# Patient Record
Sex: MALE | Employment: FULL TIME | URBAN - METROPOLITAN AREA
[De-identification: names, ages, dates, MRNs, and addresses within clinical notes are randomized per-mention and may not be internally consistent; named-entity substitution may affect disease eponyms.]

---

## 2023-05-11 LAB
ABO GROUP (TYPE) IN BLOOD: NORMAL
ALANINE AMINOTRANSFERASE (SGPT) (U/L) IN SER/PLAS: 75 U/L (ref 10–52)
ALBUMIN (G/DL) IN SER/PLAS: 4.7 G/DL (ref 3.4–5)
ALKALINE PHOSPHATASE (U/L) IN SER/PLAS: 80 U/L (ref 33–120)
ANION GAP IN SER/PLAS: 13 MMOL/L (ref 10–20)
ASPARTATE AMINOTRANSFERASE (SGOT) (U/L) IN SER/PLAS: 38 U/L (ref 9–39)
BILIRUBIN TOTAL (MG/DL) IN SER/PLAS: 0.5 MG/DL (ref 0–1.2)
CALCIDIOL (25 OH VITAMIN D3) (NG/ML) IN SER/PLAS: 26 NG/ML
CALCIUM (MG/DL) IN SER/PLAS: 10.3 MG/DL (ref 8.6–10.6)
CARBON DIOXIDE, TOTAL (MMOL/L) IN SER/PLAS: 26 MMOL/L (ref 21–32)
CHLORIDE (MMOL/L) IN SER/PLAS: 107 MMOL/L (ref 98–107)
CHOLESTEROL (MG/DL) IN SER/PLAS: 167 MG/DL (ref 0–199)
CHOLESTEROL IN HDL (MG/DL) IN SER/PLAS: 36 MG/DL
CHOLESTEROL/HDL RATIO: 4.6
CREATININE (MG/DL) IN SER/PLAS: 1.34 MG/DL (ref 0.5–1.3)
ERYTHROCYTE DISTRIBUTION WIDTH (RATIO) BY AUTOMATED COUNT: 13.3 % (ref 11.5–14.5)
ERYTHROCYTE MEAN CORPUSCULAR HEMOGLOBIN CONCENTRATION (G/DL) BY AUTOMATED: 32.2 G/DL (ref 32–36)
ERYTHROCYTE MEAN CORPUSCULAR VOLUME (FL) BY AUTOMATED COUNT: 89 FL (ref 80–100)
ERYTHROCYTES (10*6/UL) IN BLOOD BY AUTOMATED COUNT: 5.28 X10E12/L (ref 4.5–5.9)
FERRITIN (UG/LL) IN SER/PLAS: 175 UG/L (ref 20–300)
GFR MALE: 77 ML/MIN/1.73M2
GLUCOSE (MG/DL) IN SER/PLAS: 98 MG/DL (ref 74–99)
HEMATOCRIT (%) IN BLOOD BY AUTOMATED COUNT: 47.2 % (ref 41–52)
HEMOGLOBIN (G/DL) IN BLOOD: 15.2 G/DL (ref 13.5–17.5)
HEPATITIS C VIRUS AB PRESENCE IN SERUM: NONREACTIVE
LDL: 113 MG/DL (ref 0–119)
LEUKOCYTES (10*3/UL) IN BLOOD BY AUTOMATED COUNT: 10.2 X10E9/L (ref 4.4–11.3)
NON HDL CHOLESTEROL: 131 MG/DL (ref 0–149)
NRBC (PER 100 WBCS) BY AUTOMATED COUNT: 0 /100 WBC (ref 0–0)
PLATELETS (10*3/UL) IN BLOOD AUTOMATED COUNT: 296 X10E9/L (ref 150–450)
POTASSIUM (MMOL/L) IN SER/PLAS: 4.1 MMOL/L (ref 3.5–5.3)
PROTEIN TOTAL: 7.5 G/DL (ref 6.4–8.2)
RH FACTOR: NORMAL
SODIUM (MMOL/L) IN SER/PLAS: 142 MMOL/L (ref 136–145)
THYROTROPIN (MIU/L) IN SER/PLAS BY DETECTION LIMIT <= 0.05 MIU/L: 1.49 MIU/L (ref 0.44–3.98)
TRIGLYCERIDE (MG/DL) IN SER/PLAS: 92 MG/DL (ref 0–149)
URATE (MG/DL) IN SER/PLAS: 8.4 MG/DL (ref 4–7.5)
UREA NITROGEN (MG/DL) IN SER/PLAS: 13 MG/DL (ref 6–23)
VLDL: 18 MG/DL (ref 0–40)

## 2023-07-20 LAB
APPEARANCE, URINE: CLEAR
BILIRUBIN, URINE: NEGATIVE
BLOOD, URINE: NEGATIVE
COLOR, URINE: YELLOW
GLUCOSE, URINE: NEGATIVE MG/DL
HYALINE CASTS, URINE: ABNORMAL /LPF
KETONES, URINE: NEGATIVE MG/DL
LEUKOCYTE ESTERASE, URINE: NEGATIVE
MUCUS, URINE: ABNORMAL /LPF
NITRITE, URINE: NEGATIVE
PH, URINE: 5 (ref 5–8)
PROTEIN, URINE: ABNORMAL MG/DL
RBC, URINE: 1 /HPF (ref 0–5)
SPECIFIC GRAVITY, URINE: 1.02 (ref 1–1.03)
SQUAMOUS EPITHELIAL CELLS, URINE: 1 /HPF
UROBILINOGEN, URINE: <2 MG/DL (ref 0–1.9)
WBC, URINE: 3 /HPF (ref 0–5)

## 2023-10-19 ENCOUNTER — ANCILLARY PROCEDURE (OUTPATIENT)
Dept: RADIOLOGY | Facility: CLINIC | Age: 23
End: 2023-10-19
Payer: COMMERCIAL

## 2023-10-19 DIAGNOSIS — S93.401A MODERATE RIGHT ANKLE SPRAIN, INITIAL ENCOUNTER: Primary | ICD-10-CM

## 2023-10-19 DIAGNOSIS — S93.401A MODERATE RIGHT ANKLE SPRAIN, INITIAL ENCOUNTER: ICD-10-CM

## 2023-10-19 PROCEDURE — 73610 X-RAY EXAM OF ANKLE: CPT | Mod: RIGHT SIDE | Performed by: INTERNAL MEDICINE

## 2023-10-19 PROCEDURE — 73610 X-RAY EXAM OF ANKLE: CPT | Mod: RT

## 2023-10-29 DIAGNOSIS — M25.511 ACUTE PAIN OF RIGHT SHOULDER: ICD-10-CM

## 2023-10-29 DIAGNOSIS — M25.561 ACUTE PAIN OF RIGHT KNEE: ICD-10-CM

## 2023-10-30 ENCOUNTER — ANCILLARY PROCEDURE (OUTPATIENT)
Dept: RADIOLOGY | Facility: CLINIC | Age: 23
End: 2023-10-30
Payer: COMMERCIAL

## 2023-10-30 DIAGNOSIS — M25.511 ACUTE PAIN OF RIGHT SHOULDER: ICD-10-CM

## 2023-10-30 DIAGNOSIS — M25.511 RIGHT SHOULDER PAIN, UNSPECIFIED CHRONICITY: Primary | ICD-10-CM

## 2023-10-30 PROCEDURE — 73030 X-RAY EXAM OF SHOULDER: CPT | Mod: RT,FY,398

## 2023-10-30 PROCEDURE — 73221 MRI JOINT UPR EXTREM W/O DYE: CPT | Mod: RIGHT SIDE | Performed by: STUDENT IN AN ORGANIZED HEALTH CARE EDUCATION/TRAINING PROGRAM

## 2023-10-30 PROCEDURE — 73221 MRI JOINT UPR EXTREM W/O DYE: CPT | Mod: RT,398

## 2023-10-30 PROCEDURE — 73030 X-RAY EXAM OF SHOULDER: CPT | Mod: RIGHT SIDE | Performed by: RADIOLOGY

## 2023-10-30 RX ORDER — CELECOXIB 200 MG/1
200 CAPSULE ORAL DAILY
Qty: 14 CAPSULE | Refills: 0 | Status: SHIPPED | OUTPATIENT
Start: 2023-10-30 | End: 2023-11-13 | Stop reason: SDUPTHER

## 2023-11-06 ENCOUNTER — ANCILLARY PROCEDURE (OUTPATIENT)
Dept: RADIOLOGY | Facility: CLINIC | Age: 23
End: 2023-11-06
Payer: COMMERCIAL

## 2023-11-06 DIAGNOSIS — M25.561 ACUTE PAIN OF RIGHT KNEE: ICD-10-CM

## 2023-11-06 PROCEDURE — 73721 MRI JNT OF LWR EXTRE W/O DYE: CPT | Mod: RT,398

## 2023-11-06 PROCEDURE — 73721 MRI JNT OF LWR EXTRE W/O DYE: CPT | Mod: RIGHT SIDE | Performed by: RADIOLOGY

## 2023-11-07 ENCOUNTER — OFFICE VISIT (OUTPATIENT)
Dept: ORTHOPEDIC SURGERY | Facility: HOSPITAL | Age: 23
End: 2023-11-07
Payer: COMMERCIAL

## 2023-11-07 DIAGNOSIS — S83.411A SPRAIN OF MEDIAL COLLATERAL LIGAMENT OF RIGHT KNEE, INITIAL ENCOUNTER: Primary | ICD-10-CM

## 2023-11-07 PROCEDURE — 1036F TOBACCO NON-USER: CPT | Performed by: ORTHOPAEDIC SURGERY

## 2023-11-09 PROBLEM — N39.44 PRIMARY NOCTURNAL ENURESIS: Status: ACTIVE | Noted: 2023-11-09

## 2023-11-13 DIAGNOSIS — M25.511 RIGHT SHOULDER PAIN, UNSPECIFIED CHRONICITY: ICD-10-CM

## 2023-11-13 RX ORDER — CELECOXIB 200 MG/1
200 CAPSULE ORAL DAILY
Qty: 14 CAPSULE | Refills: 0 | Status: SHIPPED | OUTPATIENT
Start: 2023-11-13 | End: 2023-11-27

## 2023-11-14 DIAGNOSIS — S83.411A GRADE 2 SPRAIN OF MEDIAL COLLATERAL LIGAMENT OF RIGHT KNEE: ICD-10-CM

## 2023-11-14 PROCEDURE — 20610 DRAIN/INJ JOINT/BURSA W/O US: CPT | Performed by: ORTHOPAEDIC SURGERY

## 2023-11-14 NOTE — PROGRESS NOTES
RIGHT KNEE PRP INJECTIONPatient ID: Horace Griffin is a 23 y.o. male.    Mr. Griffin presents for a planned right knee MCL PRP injection. He has been previously indicated and examined for the procedure.    Dx is right knee MCL sprain    L Inj/Asp: R knee on 11/14/2023 9:23 AM  Indications: pain  Details: 22 G needle, superolateral approach    PRP INJECTION  Procedure, treatment alternatives, risks and benefits explained, specific risks discussed. Consent was given by the patient. Immediately prior to procedure a time out was called to verify the correct patient, procedure, equipment, support staff and site/side marked as required. Patient was prepped and draped in the usual sterile fashion.       After verbal consent achieved 15 cc of autologous blood was withdrawn from the antecubital fossa.  This was spun down in the [employer] centrifuge and 5 cc of PRP was available for injection.  Under sterile conditions the superficial MCL was injected with a 22-gauge needle with 5 cc of PRP without incident.  There were no complications.  A dressing was applied.      He will continue with his planned rehabilitation program.

## 2023-12-08 ENCOUNTER — ANCILLARY PROCEDURE (OUTPATIENT)
Dept: RADIOLOGY | Facility: CLINIC | Age: 23
End: 2023-12-08
Payer: COMMERCIAL

## 2023-12-08 ENCOUNTER — OFFICE VISIT (OUTPATIENT)
Dept: ORTHOPEDIC SURGERY | Facility: HOSPITAL | Age: 23
End: 2023-12-08
Payer: COMMERCIAL

## 2023-12-08 DIAGNOSIS — S83.281A ACUTE LATERAL MENISCUS TEAR OF RIGHT KNEE, INITIAL ENCOUNTER: Primary | ICD-10-CM

## 2023-12-08 DIAGNOSIS — M25.561 ACUTE PAIN OF RIGHT KNEE: ICD-10-CM

## 2023-12-08 DIAGNOSIS — S83.411A SPRAIN OF MEDIAL COLLATERAL LIGAMENT OF RIGHT KNEE, INITIAL ENCOUNTER: ICD-10-CM

## 2023-12-08 PROCEDURE — 73721 MRI JNT OF LWR EXTRE W/O DYE: CPT | Mod: RIGHT SIDE | Performed by: STUDENT IN AN ORGANIZED HEALTH CARE EDUCATION/TRAINING PROGRAM

## 2023-12-08 PROCEDURE — 2500000005 HC RX 250 GENERAL PHARMACY W/O HCPCS: Performed by: ORTHOPAEDIC SURGERY

## 2023-12-08 PROCEDURE — 1036F TOBACCO NON-USER: CPT | Performed by: ORTHOPAEDIC SURGERY

## 2023-12-08 PROCEDURE — 73721 MRI JNT OF LWR EXTRE W/O DYE: CPT | Mod: RT,398

## 2023-12-08 PROCEDURE — 20610 DRAIN/INJ JOINT/BURSA W/O US: CPT | Performed by: ORTHOPAEDIC SURGERY

## 2023-12-08 RX ORDER — LIDOCAINE HYDROCHLORIDE 10 MG/ML
3 INJECTION INFILTRATION; PERINEURAL
Status: COMPLETED | OUTPATIENT
Start: 2023-12-08 | End: 2023-12-08

## 2023-12-08 RX ADMIN — LIDOCAINE HYDROCHLORIDE 3 ML: 10 INJECTION, SOLUTION INFILTRATION; PERINEURAL at 10:59

## 2023-12-08 NOTE — PROGRESS NOTES
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  He has previously suffered a right knee moderate MCL sprain he was able to recover from and return to football with conservative measures and use of a brace.  We previously performed a PRP in the MCL.  He has returned to play and suffered an acute right knee recurrent valgus load and twisting injury yesterday at practice.  This resulted in new symptoms laterally with some swelling in the knee.  Repeat MRI was performed revealing a radial tear of the lateral meniscus and some lateral compartment edema with acute recurrent injury to the distal MCL.  He has been removed from play at this point in time and conservative measures initiated.    Right knee with positive effusion and slightly decreased quadricep contraction.  Range of motion 0 to 110 degrees.  He can perform a straight leg raise.  Positive lateral joint line tenderness but negative Ricardo's.  Stable Lachman and posterior drawer.  MCL stable at 0 degrees but slight jog at 30 degrees.  Mild mid to distal MCL tenderness.  Intact neurovascular exam.    He presents for a planned right knee intra-articular aspiration and injection of PRP for symptomatic relief of an acute right knee injury.  Risks and benefits were discussed.  After questions were answered consent was obtained to proceed.  Under sterile conditions 15 cc of autologous blood was withdrawn from the left dorsal hand vein by our phlebotomist.  This was spun down in the [employer] centrifuge and 5 cc of PRP was available for injection.  The right knee was then aspirated from a superolateral patellar site under sterile conditions and 30 cc of slightly blood-tinged fluid was withdrawn.  The 5 cc of PRP was then injected without incident.  A dressing was applied.    L Inj/Asp: R knee on 12/8/2023 10:59 AM  Indications: pain and joint swelling  Details: 18 G needle, superolateral  approach  Medications: 3 mL lidocaine 10 mg/mL (1 %)  Aspirate: 33 mL blood-tinged  Procedure, treatment alternatives, risks and benefits explained, specific risks discussed. Immediately prior to procedure a time out was called to verify the correct patient, procedure, equipment, support staff and site/side marked as required. Patient was prepped and draped in the usual sterile fashion.       I reviewed the findings of a recurrent right knee MCL injury with radial tear of the lateral meniscus and new subchondral edema.  The aspiration and injection was performed today for therapeutic purposes.  I recommend he utilize his brace, modalities and supervised rehabilitation.  We did discuss this may require consideration for surgical repair to maximize his outcome.  We will obtain second opinion discussion with Dr. Gutierrez for further coordination of care.

## 2023-12-11 DIAGNOSIS — N39.44 PRIMARY NOCTURNAL ENURESIS: Primary | ICD-10-CM

## 2023-12-11 RX ORDER — DESMOPRESSIN ACETATE 0.2 MG/1
0.4 TABLET ORAL NIGHTLY
COMMUNITY
Start: 2023-08-12 | End: 2023-12-11 | Stop reason: SDUPTHER

## 2023-12-11 RX ORDER — DESMOPRESSIN ACETATE 0.2 MG/1
600 TABLET ORAL NIGHTLY
Qty: 90 TABLET | Refills: 2 | Status: SHIPPED | OUTPATIENT
Start: 2023-12-11 | End: 2024-01-03

## 2023-12-28 DIAGNOSIS — I10 ESSENTIAL HYPERTENSION: Primary | ICD-10-CM

## 2023-12-28 RX ORDER — LISINOPRIL 20 MG/1
20 TABLET ORAL DAILY
Qty: 30 TABLET | Refills: 11 | Status: SHIPPED | OUTPATIENT
Start: 2023-12-28 | End: 2024-06-10

## 2024-01-02 DIAGNOSIS — N39.44 PRIMARY NOCTURNAL ENURESIS: ICD-10-CM

## 2024-01-03 RX ORDER — DESMOPRESSIN ACETATE 0.2 MG/1
600 TABLET ORAL NIGHTLY
Qty: 270 TABLET | Refills: 1 | Status: SHIPPED | OUTPATIENT
Start: 2024-01-03 | End: 2024-04-02

## 2024-01-25 ENCOUNTER — OFFICE VISIT (OUTPATIENT)
Dept: ORTHOPEDIC SURGERY | Facility: HOSPITAL | Age: 24
End: 2024-01-25
Payer: COMMERCIAL

## 2024-01-25 DIAGNOSIS — S83.411A SPRAIN OF MEDIAL COLLATERAL LIGAMENT OF RIGHT KNEE, INITIAL ENCOUNTER: Primary | ICD-10-CM

## 2024-01-25 PROCEDURE — 99024 POSTOP FOLLOW-UP VISIT: CPT | Performed by: STUDENT IN AN ORGANIZED HEALTH CARE EDUCATION/TRAINING PROGRAM

## 2024-01-25 PROCEDURE — 1036F TOBACCO NON-USER: CPT | Performed by: STUDENT IN AN ORGANIZED HEALTH CARE EDUCATION/TRAINING PROGRAM

## 2024-01-25 RX ORDER — CEPHALEXIN 500 MG/1
500 CAPSULE ORAL EVERY 6 HOURS
Qty: 40 CAPSULE | Refills: 0 | Status: SHIPPED | OUTPATIENT
Start: 2024-01-25 | End: 2024-02-04

## 2024-01-30 NOTE — PROGRESS NOTES
Mr. Griffin presents noting a small suture tail coming out of the lateral portal site.  He is recovering well from his outside orthopedic surgery.  All of his sutures have been removed, however it does appear that he has a small Vicryl suture tail coming out of the lateral portal site.  No concern for infection.  He denies any fevers or chills.  No surrounding erythema or warmth.  No drainage.  The small suture tail was removed using only a pickup without complication.  This was dressed with Steri-Strips.  He was provided a prescription for Keflex to avoid any skin infection.  He will continue with his rehabilitation protocol per his primary surgeon.

## 2024-02-09 DIAGNOSIS — S83.411A SPRAIN OF MEDIAL COLLATERAL LIGAMENT OF RIGHT KNEE, INITIAL ENCOUNTER: ICD-10-CM

## 2024-02-09 DIAGNOSIS — S83.281S ACUTE LATERAL MENISCUS TEAR OF RIGHT KNEE, SEQUELA: ICD-10-CM

## 2024-03-13 DIAGNOSIS — Z20.2 POSSIBLE EXPOSURE TO STD: Primary | ICD-10-CM

## 2024-05-30 VITALS — BODY MASS INDEX: 36.45 KG/M2 | WEIGHT: 315 LBS | HEIGHT: 78 IN

## 2024-05-30 DIAGNOSIS — E66.01 CLASS 3 SEVERE OBESITY DUE TO EXCESS CALORIES WITHOUT SERIOUS COMORBIDITY WITH BODY MASS INDEX (BMI) OF 45.0 TO 49.9 IN ADULT (MULTI): Primary | ICD-10-CM

## 2024-05-30 DIAGNOSIS — E66.01 CLASS 3 SEVERE OBESITY DUE TO EXCESS CALORIES WITHOUT SERIOUS COMORBIDITY WITH BODY MASS INDEX (BMI) OF 45.0 TO 49.9 IN ADULT (MULTI): ICD-10-CM

## 2024-05-30 RX ORDER — TIRZEPATIDE 2.5 MG/.5ML
2.5 INJECTION, SOLUTION SUBCUTANEOUS
Qty: 2 ML | Refills: 2 | Status: SHIPPED | OUTPATIENT
Start: 2024-06-02

## 2024-05-30 NOTE — PROGRESS NOTES
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  He is now 425 pounds and would like to be below 375 pounds for his professional plain weight.  He wants to discuss the GLP-1 peptide agonist.  He denies any family history of cancer.    He has no other complaints.    Physical Examination:       5/11/2023     9:03 PM 6/7/2023     9:20 AM 6/7/2023     9:39 AM 5/30/2024     8:29 AM   Vitals   Systolic 155      Diastolic 95      Heart Rate 69      Temp  37.3 °C (99.1 °F) 37.3 °C (99.1 °F)    Height (in) 2.032 m (6' 8\")   2.032 m (6' 8\")   Weight (lb) 370   425   BMI 40.65 kg/m2   46.69 kg/m2   BSA (m2) 3.08 m2   3.3 m2     Problem List Items Addressed This Visit    None  Visit Diagnoses         Codes    Class 3 severe obesity due to excess calories without serious comorbidity with body mass index (BMI) of 45.0 to 49.9 in adult (Multi)    -  Primary E66.01, Z68.42    Relevant Medications    tirzepatide 2.5 mg/0.5 mL pen injector            Assessment and Plan:     We agreed to start a trial of Mounjaro weekly.  We will provide his first 2 doses in the training room over the next 2 weeks and teach him how to perform this himself.    **This note was dictated using Dragon speech recognition software and was not corrected for spelling or grammatical errors**.    Paul Boyd MD  Sports Medicine Specialist  [facility] [place]      "

## 2024-06-06 DIAGNOSIS — M25.561 CHRONIC PAIN OF RIGHT KNEE: ICD-10-CM

## 2024-06-06 DIAGNOSIS — G89.29 CHRONIC PAIN OF RIGHT KNEE: ICD-10-CM

## 2024-06-06 RX ORDER — CELECOXIB 200 MG/1
200 CAPSULE ORAL DAILY
Qty: 14 CAPSULE | Refills: 0 | Status: SHIPPED | OUTPATIENT
Start: 2024-06-06 | End: 2024-06-20

## 2024-06-10 ENCOUNTER — OFFICE VISIT (OUTPATIENT)
Dept: ORTHOPEDIC SURGERY | Facility: CLINIC | Age: 24
End: 2024-06-10
Payer: COMMERCIAL

## 2024-06-10 ENCOUNTER — LAB (OUTPATIENT)
Dept: LAB | Facility: LAB | Age: 24
End: 2024-06-10
Payer: COMMERCIAL

## 2024-06-10 VITALS
DIASTOLIC BLOOD PRESSURE: 110 MMHG | BODY MASS INDEX: 36.45 KG/M2 | SYSTOLIC BLOOD PRESSURE: 170 MMHG | HEART RATE: 60 BPM | HEIGHT: 78 IN | WEIGHT: 315 LBS

## 2024-06-10 DIAGNOSIS — L70.9 ACNE, UNSPECIFIED ACNE TYPE: ICD-10-CM

## 2024-06-10 DIAGNOSIS — J45.990 EXERCISE-INDUCED BRONCHOSPASM (HHS-HCC): ICD-10-CM

## 2024-06-10 DIAGNOSIS — I10 HTN (HYPERTENSION), BENIGN: ICD-10-CM

## 2024-06-10 DIAGNOSIS — E66.01 CLASS 3 SEVERE OBESITY WITHOUT SERIOUS COMORBIDITY WITH BODY MASS INDEX (BMI) OF 40.0 TO 44.9 IN ADULT, UNSPECIFIED OBESITY TYPE (MULTI): ICD-10-CM

## 2024-06-10 DIAGNOSIS — N39.44 PRIMARY NOCTURNAL ENURESIS: ICD-10-CM

## 2024-06-10 DIAGNOSIS — Z02.5 SPORTS PHYSICAL: Primary | ICD-10-CM

## 2024-06-10 DIAGNOSIS — G47.33 OSA (OBSTRUCTIVE SLEEP APNEA): ICD-10-CM

## 2024-06-10 DIAGNOSIS — Z02.5 SPORTS PHYSICAL: ICD-10-CM

## 2024-06-10 LAB
25(OH)D3 SERPL-MCNC: 19 NG/ML (ref 30–100)
ALBUMIN SERPL BCP-MCNC: 4.3 G/DL (ref 3.4–5)
ALP SERPL-CCNC: 90 U/L (ref 33–120)
ALT SERPL W P-5'-P-CCNC: 80 U/L (ref 10–52)
ANION GAP SERPL CALC-SCNC: 15 MMOL/L (ref 10–20)
APPEARANCE UR: CLEAR
AST SERPL W P-5'-P-CCNC: 33 U/L (ref 9–39)
BACTERIA #/AREA URNS AUTO: ABNORMAL /HPF
BASOPHILS # BLD AUTO: 0.04 X10*3/UL (ref 0–0.1)
BASOPHILS NFR BLD AUTO: 0.5 %
BILIRUB SERPL-MCNC: 0.6 MG/DL (ref 0–1.2)
BILIRUB UR STRIP.AUTO-MCNC: NEGATIVE MG/DL
BUN SERPL-MCNC: 12 MG/DL (ref 6–23)
CALCIUM SERPL-MCNC: 9.5 MG/DL (ref 8.6–10.6)
CHLORIDE SERPL-SCNC: 105 MMOL/L (ref 98–107)
CHOLEST SERPL-MCNC: 182 MG/DL (ref 0–199)
CHOLESTEROL/HDL RATIO: 5.2
CO2 SERPL-SCNC: 24 MMOL/L (ref 21–32)
COLOR UR: ABNORMAL
CREAT SERPL-MCNC: 1.24 MG/DL (ref 0.5–1.3)
EGFRCR SERPLBLD CKD-EPI 2021: 84 ML/MIN/1.73M*2
EOSINOPHIL # BLD AUTO: 0.09 X10*3/UL (ref 0–0.7)
EOSINOPHIL NFR BLD AUTO: 1.1 %
ERYTHROCYTE [DISTWIDTH] IN BLOOD BY AUTOMATED COUNT: 12.9 % (ref 11.5–14.5)
FERRITIN SERPL-MCNC: 167 NG/ML (ref 20–300)
GLUCOSE SERPL-MCNC: 109 MG/DL (ref 74–99)
GLUCOSE UR STRIP.AUTO-MCNC: NORMAL MG/DL
HCT VFR BLD AUTO: 48.7 % (ref 41–52)
HDLC SERPL-MCNC: 35.2 MG/DL
HGB BLD-MCNC: 16.5 G/DL (ref 13.5–17.5)
IMM GRANULOCYTES # BLD AUTO: 0.02 X10*3/UL (ref 0–0.7)
IMM GRANULOCYTES NFR BLD AUTO: 0.3 % (ref 0–0.9)
KETONES UR STRIP.AUTO-MCNC: NEGATIVE MG/DL
LDLC SERPL CALC-MCNC: 126 MG/DL
LEUKOCYTE ESTERASE UR QL STRIP.AUTO: ABNORMAL
LYMPHOCYTES # BLD AUTO: 2.29 X10*3/UL (ref 1.2–4.8)
LYMPHOCYTES NFR BLD AUTO: 28.7 %
MCH RBC QN AUTO: 29.2 PG (ref 26–34)
MCHC RBC AUTO-ENTMCNC: 33.9 G/DL (ref 32–36)
MCV RBC AUTO: 86 FL (ref 80–100)
MONOCYTES # BLD AUTO: 0.43 X10*3/UL (ref 0.1–1)
MONOCYTES NFR BLD AUTO: 5.4 %
MUCOUS THREADS #/AREA URNS AUTO: ABNORMAL /LPF
NEUTROPHILS # BLD AUTO: 5.12 X10*3/UL (ref 1.2–7.7)
NEUTROPHILS NFR BLD AUTO: 64 %
NITRITE UR QL STRIP.AUTO: NEGATIVE
NON HDL CHOLESTEROL: 147 MG/DL (ref 0–149)
NRBC BLD-RTO: 0 /100 WBCS (ref 0–0)
PH UR STRIP.AUTO: 6 [PH]
PLATELET # BLD AUTO: 256 X10*3/UL (ref 150–450)
POTASSIUM SERPL-SCNC: 3.8 MMOL/L (ref 3.5–5.3)
PROT SERPL-MCNC: 7.1 G/DL (ref 6.4–8.2)
PROT UR STRIP.AUTO-MCNC: ABNORMAL MG/DL
RBC # BLD AUTO: 5.66 X10*6/UL (ref 4.5–5.9)
RBC # UR STRIP.AUTO: NEGATIVE /UL
RBC #/AREA URNS AUTO: ABNORMAL /HPF
SODIUM SERPL-SCNC: 140 MMOL/L (ref 136–145)
SP GR UR STRIP.AUTO: 1.02
SQUAMOUS #/AREA URNS AUTO: ABNORMAL /HPF
TRIGL SERPL-MCNC: 105 MG/DL (ref 0–149)
TSH SERPL-ACNC: 1.19 MIU/L (ref 0.44–3.98)
URATE SERPL-MCNC: 9 MG/DL (ref 4–7.5)
UROBILINOGEN UR STRIP.AUTO-MCNC: NORMAL MG/DL
VLDL: 21 MG/DL (ref 0–40)
WBC # BLD AUTO: 8 X10*3/UL (ref 4.4–11.3)
WBC #/AREA URNS AUTO: ABNORMAL /HPF

## 2024-06-10 PROCEDURE — 80053 COMPREHEN METABOLIC PANEL: CPT | Mod: NFL

## 2024-06-10 PROCEDURE — 82728 ASSAY OF FERRITIN: CPT | Mod: NFL

## 2024-06-10 PROCEDURE — 84550 ASSAY OF BLOOD/URIC ACID: CPT | Mod: NFL

## 2024-06-10 PROCEDURE — 85025 COMPLETE CBC W/AUTO DIFF WBC: CPT | Mod: NFL

## 2024-06-10 PROCEDURE — 82306 VITAMIN D 25 HYDROXY: CPT | Mod: NFL

## 2024-06-10 PROCEDURE — 80061 LIPID PANEL: CPT | Mod: NFL

## 2024-06-10 PROCEDURE — 84443 ASSAY THYROID STIM HORMONE: CPT | Mod: NFL

## 2024-06-10 PROCEDURE — 81001 URINALYSIS AUTO W/SCOPE: CPT | Mod: NFL

## 2024-06-10 RX ORDER — LISINOPRIL 40 MG/1
40 TABLET ORAL DAILY
Qty: 30 TABLET | Refills: 11 | Status: SHIPPED | OUTPATIENT
Start: 2024-06-10 | End: 2025-06-10

## 2024-06-10 RX ORDER — BENZOYL PEROXIDE 100 MG/ML
LIQUID TOPICAL 2 TIMES DAILY
Qty: 227 G | Refills: 0 | Status: SHIPPED | OUTPATIENT
Start: 2024-06-10 | End: 2025-06-10

## 2024-06-10 NOTE — PROGRESS NOTES
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 He reports one previous concussion in April 2021 and was out for 2 weeks. He has had no residual issues with it. He denies any past history of heat illness, ADHD or migraine headache.   He has a positive history of childhood asthma and still uses an inhaler 2-3 times per year during cold weather activity.   He has a history of hypertension and had taken lisinopril 20 mg daily which we increased to 30 mg last year.  He admits that he does not take this every day and only when he feels like it. He has been on this for 6 or 7 years.   He reports sleep apnea since he was freshman in college.  He has CPAP machine but does not like the facemask and currently is not using it.   He was recently started on Mounjaro last week for weight loss and denies any side effects.  He is still having nocturnal enuresis and reports taking his desmopressin 0.60 mg dose every day.  He reports that this is doing well and he has no problems with that despite sharing no improvement multiple times throughout the football season.  He denies any drug allergies. He denies any supplementation use.    He has no other complaints today.    Physical Examination:       5/11/2023     9:03 PM 6/7/2023     9:20 AM 6/7/2023     9:39 AM 5/30/2024     8:29 AM 6/10/2024     5:39 PM   Vitals   Systolic 155    170   Diastolic 95    110   Heart Rate 69    60   Temp  37.3 °C (99.1 °F) 37.3 °C (99.1 °F)     Height (in) 2.032 m (6' 8\")   2.032 m (6' 8\") 2.032 m (6' 8\")   Weight (lb) 370   425 375   BMI 40.65 kg/m2   46.69 kg/m2 41.2 kg/m2   BSA (m2) 3.08 m2   3.3 m2 3.1 m2   Visit Report     Report       CONSTITUTIONAL  General appearance: Alert and in no acute distress. Well developed, well nourished.   HEAD AND FACE  Head and face: Normal.    EYES  External Eye, Conjunctiva and Lids: Normal external exam - pupils were equal in " size, round, reactive to light (PERRL) with normal accommodation and extraocular movements intact (EOMI).    Pupils and irises: Normal.    EARS, NOSE, MOUTH, AND THROAT  External inspection of ears and nose: Normal.     Hearing: Normal.     Nasal mucosa, septum, and turbinates: Normal.     Lips, teeth, and gums: Normal.     Oropharynx: Normal.    NECK  Neck: No neck mass was observed. Supple.    Thyroid: Not enlarged and there were no palpable thyroid nodules.   PULMONARY  Respiratory effort: No respiratory distress.    Auscultation of lungs: Clear bilateral breath sounds.   CARDIOVASCULAR  Auscultation of heart: Heart rate and rhythm were normal, normal S1 and S2, no gallops, no murmurs and no pericardial rub.    Femoral pulses: Normal.     Pedal pulses: Normal.    Peripheral vascular exam: Normal.     Examination of extremities: No peripheral edema.   ABDOMEN  Abdomen: Normal bowel sounds, soft nontender; no abdominal mass palpated. No rebound, rigidity or guarding.    Liver and spleen: No hepatosplenomegaly.    Examination for hernias: No hernias.   GENITOURINARY  Scrotal contents: Normal. The testicles were not swollen and there were no testicular masses.    Penis: No penile abnormalities.   LYMPHATIC  Palpation of lymph nodes in neck: No lymphadenopathy.   MUSCULOSKELETAL  Gait and station: Normal.    Digits and nails: No clubbing or cyanosis of the fingernails.    Inspection/palpation of joints, bones, and muscles: No joint swelling seen, normal movements of all extremities.    Range of motion: Normal.     Stability: Normal.     Muscle strength/tone: Normal.    SKIN  Skin and subcutaneous tissue: Normal skin color and pigmentation, normal skin turgor, and no rash.    Palpation of skin and subcutaneous tissue: Normal.    NEUROLOGIC  Cranial nerves: 2-12 grossly intact.    Cortical function: Normal.     Sensation: Normal.     Coordination: Normal.    PSYCHIATRIC  Judgment and insight: Intact.    Orientation to  person, place, and time: Alert and oriented x 3.    Recent and remote memory: Normal.     Mood and affect: Normal.    Imaging/studies:    Chest x-ray: two-view chest x-ray obtained today are without acute abnormality.    EKG: Normal sinus rhythm, no other abnormalities.    Labs: Pending     Problem List Items Addressed This Visit             ICD-10-CM    Primary nocturnal enuresis N39.44     Other Visit Diagnoses         Codes    Sports physical    -  Primary Z02.5    Relevant Orders    CBC and Auto Differential    Comprehensive Metabolic Panel    Ferritin    Lipid Panel    TSH with reflex to Free T4 if abnormal    Uric Acid    Urinalysis with Reflex Microscopic    Vitamin D 25-Hydroxy,Total (for eval of Vitamin D levels)    Acne, unspecified acne type     L70.9    Relevant Medications    benzoyl peroxide (Benzac AC) 10 % external wash    HTN (hypertension), benign     I10    CESAR (obstructive sleep apnea)     G47.33    Exercise-induced bronchospasm (HHS-HCC)     J45.990    Class 3 severe obesity without serious comorbidity with body mass index (BMI) of 40.0 to 44.9 in adult, unspecified obesity type (Multi)     E66.01, Z68.41              Discussion:     He was found to have a normal physical exam, EKG and chest x-ray today. He needs no further evaluation and is cleared for full participation.  HTN-we discussed that BP is too high and unacceptable to play football.  He admits to not taking medicine daily and I strongly encouraged him to start daily medicine.  We increase his dose also.  He will check his blood pressure 2-3 times per week in the training room with a large cuff and manual machine.  CESAR-we will try to get a better fitting mask for him and he was strongly encouraged to use to his CPAP nightly.  This should help his nocturnal enuresis and BP.  EIB-stable with current medicines  Obesity-continue current injectable medication, Mounjaro.  We will increase it every 4 weeks.  Nocturnal enuresis-strongly  encouraged to take medicine every night and continue current dose.    **This note was dictated using Dragon speech recognition software and was not corrected for spelling or grammatical errors**.    Paul Boyd MD  Sports Medicine Specialist  [facility] [place]

## 2024-06-12 DIAGNOSIS — E66.01 CLASS 3 SEVERE OBESITY WITHOUT SERIOUS COMORBIDITY WITH BODY MASS INDEX (BMI) OF 40.0 TO 44.9 IN ADULT, UNSPECIFIED OBESITY TYPE (MULTI): Primary | ICD-10-CM

## 2024-06-12 NOTE — PROGRESS NOTES
Patient presents for Mounjaro injection (Lot#[id number], exp 1/2/26).  Received dose last week in [facility]. No side effects reported.    Injection administered today in [facility]. No complications.     Return on 6/19 for next injection.

## 2024-06-19 DIAGNOSIS — E66.01 CLASS 3 SEVERE OBESITY DUE TO EXCESS CALORIES WITHOUT SERIOUS COMORBIDITY WITH BODY MASS INDEX (BMI) OF 45.0 TO 49.9 IN ADULT (MULTI): ICD-10-CM

## 2024-06-19 RX ORDER — TIRZEPATIDE 5 MG/.5ML
5 INJECTION, SOLUTION SUBCUTANEOUS
Qty: 2 ML | Refills: 0 | Status: SHIPPED | OUTPATIENT
Start: 2024-06-19

## 2024-07-07 DIAGNOSIS — L70.9 ACNE, UNSPECIFIED ACNE TYPE: ICD-10-CM

## 2024-07-08 RX ORDER — BENZOYL PEROXIDE 100 MG/ML
LIQUID TOPICAL 2 TIMES DAILY
Qty: 148 ML | Refills: 3 | Status: SHIPPED | OUTPATIENT
Start: 2024-07-08

## 2024-07-17 DIAGNOSIS — E66.01 CLASS 3 SEVERE OBESITY WITHOUT SERIOUS COMORBIDITY WITH BODY MASS INDEX (BMI) OF 40.0 TO 44.9 IN ADULT, UNSPECIFIED OBESITY TYPE (MULTI): Primary | ICD-10-CM

## 2024-07-17 RX ORDER — TIRZEPATIDE 7.5 MG/.5ML
7.5 INJECTION, SOLUTION SUBCUTANEOUS
Qty: 2 ML | Refills: 1 | Status: SHIPPED | OUTPATIENT
Start: 2024-07-17

## 2024-07-29 ENCOUNTER — DOCUMENTATION (OUTPATIENT)
Dept: ORTHOPEDIC SURGERY | Facility: CLINIC | Age: 24
End: 2024-07-29
Payer: COMMERCIAL

## 2024-07-29 NOTE — PROGRESS NOTES
Horace presented to the training room at the [facility] with recent STI exposure and he is complaining of some discharge.  He is going to go to the [facility] clinic today for testing and Rocephin.  He will follow up in training room.

## 2024-07-30 ENCOUNTER — DOCUMENTATION (OUTPATIENT)
Dept: ORTHOPEDIC SURGERY | Facility: CLINIC | Age: 24
End: 2024-07-30
Payer: COMMERCIAL

## 2024-07-30 NOTE — PROGRESS NOTES
His urine culture came back as ureaplasma that is susceptible to azithromycin and rocephin, both of which he got yesterday.  He states his symptoms have improved.

## 2024-08-14 DIAGNOSIS — E66.01 CLASS 3 SEVERE OBESITY WITHOUT SERIOUS COMORBIDITY WITH BODY MASS INDEX (BMI) OF 40.0 TO 44.9 IN ADULT, UNSPECIFIED OBESITY TYPE (MULTI): Primary | ICD-10-CM

## 2024-08-15 DIAGNOSIS — S83.281D ACUTE LATERAL MENISCUS TEAR OF RIGHT KNEE, SUBSEQUENT ENCOUNTER: Primary | ICD-10-CM

## 2024-08-15 RX ORDER — CELECOXIB 200 MG/1
200 CAPSULE ORAL DAILY
Qty: 14 CAPSULE | Refills: 0 | Status: SHIPPED | OUTPATIENT
Start: 2024-08-15 | End: 2024-08-29

## 2024-09-16 ENCOUNTER — HOSPITAL ENCOUNTER (OUTPATIENT)
Dept: RADIOLOGY | Facility: HOSPITAL | Age: 24
Discharge: HOME | End: 2024-09-16
Payer: COMMERCIAL

## 2024-09-16 DIAGNOSIS — M25.561 ACUTE PAIN OF RIGHT KNEE: Primary | ICD-10-CM

## 2024-09-16 DIAGNOSIS — M25.561 ACUTE PAIN OF RIGHT KNEE: ICD-10-CM

## 2024-09-16 PROCEDURE — 73721 MRI JNT OF LWR EXTRE W/O DYE: CPT | Mod: RT,398

## 2024-09-17 ENCOUNTER — OFFICE VISIT (OUTPATIENT)
Dept: ORTHOPEDIC SURGERY | Facility: HOSPITAL | Age: 24
End: 2024-09-17
Payer: COMMERCIAL

## 2024-09-17 DIAGNOSIS — M22.41 CHONDROMALACIA OF RIGHT PATELLOFEMORAL JOINT: ICD-10-CM

## 2024-09-17 DIAGNOSIS — M25.461 EFFUSION, RIGHT KNEE: Primary | ICD-10-CM

## 2024-09-17 PROCEDURE — 99213 OFFICE O/P EST LOW 20 MIN: CPT | Performed by: ORTHOPAEDIC SURGERY

## 2024-09-17 NOTE — PROGRESS NOTES
Mr. Griffin presented to the office for a planned right knee intra-articular aspiration and injection.  An MRI of the right knee was performed yesterday.  This was reviewed with Mr. Griffin as well as second opinion review by Todd Gutierrez in [place].  This reveals evidence of trochlear cartilage delamination with evidence of at least 1 small loose body.  There is a large effusion.  Prior lateral meniscus and MCL repair performed by Dr. Gutierrez appear grossly intact.  It was agreed upon by the patient, his agent and second opinion physician to perform an aspiration and injection of steroid medication as a therapeutic measure.  I discussed the risks and benefits with Mr. Griffin including infection, recurrent effusion, persistent pain, mechanical symptoms from the loose body, progressive degenerative changes.  He has no allergies to the planned injectable medications.  He provided verbal consent to proceed.  Right knee intra-articular arthrocentesis injection is offered for therapeutic purposes.  Indication is knee effusion and pain.  Risks and benefits of the injection were discussed.  After questions were answered, consent was provided to proceed.  Under sterile conditions, the knee was aspirated through a superolateral patellar site with an 18-gauge needle and 80 cc of straw-colored fluid was withdrawn.  The knee was then injected through a superolateral patellar site with 40 mg Kenalog and 4 cc lidocaine without complication.  The patient tolerated the injection well.  A dressing was applied.  Post injection instructions were given.  His compressive stocking was applied.  He will continue with his supervised rehabilitation and we will monitor this day to day.

## 2024-09-27 ENCOUNTER — DOCUMENTATION (OUTPATIENT)
Dept: ORTHOPEDIC SURGERY | Facility: CLINIC | Age: 24
End: 2024-09-27
Payer: COMMERCIAL

## 2024-09-27 VITALS — BODY MASS INDEX: 44.18 KG/M2 | WEIGHT: 315 LBS

## 2024-09-27 DIAGNOSIS — E66.01 CLASS 3 SEVERE OBESITY WITHOUT SERIOUS COMORBIDITY WITH BODY MASS INDEX (BMI) OF 40.0 TO 44.9 IN ADULT, UNSPECIFIED OBESITY TYPE: ICD-10-CM

## 2024-09-27 PROBLEM — Z78.9 ADVISED ABOUT MANAGEMENT OF WEIGHT: Status: ACTIVE | Noted: 2024-09-27

## 2024-09-27 NOTE — PROGRESS NOTES
I saw Horace today for his weight management.  He has been on Mounjaro since the start of the season.  We did a weigh in today for monitoring purposes.  He was weight in a pain of compression shorts and a left leg compression sleeve.  His weigh was 402.2 lbs.

## 2024-10-10 ENCOUNTER — TELEPHONE (OUTPATIENT)
Dept: ORTHOPEDIC SURGERY | Facility: HOSPITAL | Age: 24
End: 2024-10-10
Payer: COMMERCIAL

## 2024-10-10 DIAGNOSIS — R11.0 NAUSEA: Primary | ICD-10-CM

## 2024-10-10 RX ORDER — ONDANSETRON 8 MG/1
8 TABLET, ORALLY DISINTEGRATING ORAL EVERY 8 HOURS PRN
Qty: 20 TABLET | Refills: 3 | Status: SHIPPED | OUTPATIENT
Start: 2024-10-10 | End: 2025-01-08

## 2024-10-31 VITALS — BODY MASS INDEX: 32.86 KG/M2 | HEIGHT: 78 IN | WEIGHT: 284 LBS

## 2024-10-31 DIAGNOSIS — E66.813 CLASS 3 SEVERE OBESITY WITHOUT SERIOUS COMORBIDITY WITH BODY MASS INDEX (BMI) OF 40.0 TO 44.9 IN ADULT, UNSPECIFIED OBESITY TYPE: Primary | ICD-10-CM

## 2024-10-31 DIAGNOSIS — G47.33 OSA (OBSTRUCTIVE SLEEP APNEA): ICD-10-CM

## 2024-10-31 DIAGNOSIS — E66.01 CLASS 3 SEVERE OBESITY WITHOUT SERIOUS COMORBIDITY WITH BODY MASS INDEX (BMI) OF 40.0 TO 44.9 IN ADULT, UNSPECIFIED OBESITY TYPE: Primary | ICD-10-CM

## 2024-11-04 ENCOUNTER — HOSPITAL ENCOUNTER (OUTPATIENT)
Dept: RADIOLOGY | Facility: CLINIC | Age: 24
Discharge: HOME | End: 2024-11-04
Payer: COMMERCIAL

## 2024-11-04 DIAGNOSIS — M25.512 ACUTE PAIN OF LEFT SHOULDER: Primary | ICD-10-CM

## 2024-11-04 DIAGNOSIS — M25.512 ACUTE PAIN OF LEFT SHOULDER: ICD-10-CM

## 2024-11-04 PROCEDURE — 73221 MRI JOINT UPR EXTREM W/O DYE: CPT | Mod: LT,398

## 2024-11-04 RX ORDER — CELECOXIB 200 MG/1
200 CAPSULE ORAL DAILY
Qty: 14 CAPSULE | Refills: 0 | Status: SHIPPED | OUTPATIENT
Start: 2024-11-04 | End: 2024-11-18

## 2024-11-18 ENCOUNTER — PREP FOR PROCEDURE (OUTPATIENT)
Dept: ORTHOPEDIC SURGERY | Facility: CLINIC | Age: 24
End: 2024-11-18

## 2024-11-18 ENCOUNTER — ANESTHESIA EVENT (OUTPATIENT)
Dept: OPERATING ROOM | Facility: HOSPITAL | Age: 24
End: 2024-11-18
Payer: COMMERCIAL

## 2024-11-18 ENCOUNTER — HOSPITAL ENCOUNTER (OUTPATIENT)
Dept: RADIOLOGY | Facility: CLINIC | Age: 24
Discharge: HOME | End: 2024-11-18
Payer: COMMERCIAL

## 2024-11-18 DIAGNOSIS — S99.912A LEFT ANKLE INJURY, INITIAL ENCOUNTER: ICD-10-CM

## 2024-11-18 DIAGNOSIS — S82.839A TRAUMATIC CLOSED DISPLACED FRACTURE OF DISTAL FIBULA: ICD-10-CM

## 2024-11-18 PROBLEM — I10 HTN (HYPERTENSION): Status: ACTIVE | Noted: 2024-11-18

## 2024-11-18 PROBLEM — E66.9 OBESITY: Status: ACTIVE | Noted: 2024-11-18

## 2024-11-18 PROCEDURE — 73721 MRI JNT OF LWR EXTRE W/O DYE: CPT | Mod: LT,398

## 2024-11-18 SDOH — HEALTH STABILITY: MENTAL HEALTH: CURRENT SMOKER: 0

## 2024-11-18 NOTE — ANESTHESIA PREPROCEDURE EVALUATION
Patient: Horace Griffin    Procedure Information       Date/Time: 11/19/24 0710    Procedure: *FIRST CASE, 23 HOUR* LEFT Open Reduction Internal Fixation Distal Fibula, tight rope fixation, diagnostic arthroscopy (LMA/PNB, FLUORO) (Arthrex Fibula Fx set, tightropes, Athrex nanoscope, FLUORO, 5 inch plaster splint material) (Left: Ankle) - CONFIDENTIAL, LAST CASE, NEEDS FLUORO    Location: [facility] OR  / [facility]    Surgeons: Nickolas Bolanos MD          No past medical history on file.  No past surgical history on file.    Relevant Problems   Anesthesia (within normal limits)      Cardiac   (+) HTN (hypertension)       Clinical information reviewed:                   NPO Detail:  No data recorded     Physical Exam    Airway  Mallampati: II  TM distance: >3 FB  Neck ROM: full     Cardiovascular   Comments: deferred   Dental   Comments: No loose teeth   Pulmonary   Comments: deferred   Abdominal     Comments: deferred           Anesthesia Plan    History of general anesthesia?: yes  History of complications of general anesthesia?: no    ASA 2     general and regional   (On Tirzepatide, last dose 1 week ago)  The patient is not a current smoker.  Patient was not previously instructed to abstain from smoking on day of procedure.  Patient did not smoke on day of procedure.  Education provided regarding risk of obstructive sleep apnea.  intravenous induction   Postoperative administration of opioids is intended.  Anesthetic plan and risks discussed with patient.  Use of blood products discussed with patient who consented to blood products.    Plan discussed with CRNA and CAA.

## 2024-11-19 ENCOUNTER — APPOINTMENT (OUTPATIENT)
Dept: RADIOLOGY | Facility: HOSPITAL | Age: 24
End: 2024-11-19
Payer: COMMERCIAL

## 2024-11-19 ENCOUNTER — ANESTHESIA (OUTPATIENT)
Dept: OPERATING ROOM | Facility: HOSPITAL | Age: 24
End: 2024-11-19
Payer: COMMERCIAL

## 2024-11-19 ENCOUNTER — HOSPITAL ENCOUNTER (OUTPATIENT)
Facility: HOSPITAL | Age: 24
Discharge: HOME | End: 2024-11-20
Attending: ORTHOPAEDIC SURGERY | Admitting: ORTHOPAEDIC SURGERY
Payer: COMMERCIAL

## 2024-11-19 DIAGNOSIS — S82.62XA CLOSED DISPLACED FRACTURE OF LATERAL MALLEOLUS OF LEFT FIBULA, INITIAL ENCOUNTER: Primary | ICD-10-CM

## 2024-11-19 PROCEDURE — 2500000001 HC RX 250 WO HCPCS SELF ADMINISTERED DRUGS (ALT 637 FOR MEDICARE OP): Performed by: PHYSICIAN ASSISTANT

## 2024-11-19 PROCEDURE — 2500000004 HC RX 250 GENERAL PHARMACY W/ HCPCS (ALT 636 FOR OP/ED): Performed by: ORTHOPAEDIC SURGERY

## 2024-11-19 PROCEDURE — 2780000003 HC OR 278 NO HCPCS: Performed by: ORTHOPAEDIC SURGERY

## 2024-11-19 PROCEDURE — 7100000011 HC EXTENDED STAY RECOVERY HOURLY - NURSING UNIT

## 2024-11-19 PROCEDURE — 2500000004 HC RX 250 GENERAL PHARMACY W/ HCPCS (ALT 636 FOR OP/ED)

## 2024-11-19 PROCEDURE — 27792 TREATMENT OF ANKLE FRACTURE: CPT | Performed by: ORTHOPAEDIC SURGERY

## 2024-11-19 PROCEDURE — 2720000007 HC OR 272 NO HCPCS: Performed by: ORTHOPAEDIC SURGERY

## 2024-11-19 PROCEDURE — 7100000002 HC RECOVERY ROOM TIME - EACH INCREMENTAL 1 MINUTE: Performed by: ORTHOPAEDIC SURGERY

## 2024-11-19 PROCEDURE — 99242 OFF/OP CONSLTJ NEW/EST SF 20: CPT | Performed by: STUDENT IN AN ORGANIZED HEALTH CARE EDUCATION/TRAINING PROGRAM

## 2024-11-19 PROCEDURE — 7100000001 HC RECOVERY ROOM TIME - INITIAL BASE CHARGE: Performed by: ORTHOPAEDIC SURGERY

## 2024-11-19 PROCEDURE — 3600000009 HC OR TIME - EACH INCREMENTAL 1 MINUTE - PROCEDURE LEVEL FOUR: Performed by: ORTHOPAEDIC SURGERY

## 2024-11-19 PROCEDURE — 2500000005 HC RX 250 GENERAL PHARMACY W/O HCPCS

## 2024-11-19 PROCEDURE — 29898 ANKLE ARTHROSCOPY/SURGERY: CPT | Performed by: ORTHOPAEDIC SURGERY

## 2024-11-19 PROCEDURE — 2500000001 HC RX 250 WO HCPCS SELF ADMINISTERED DRUGS (ALT 637 FOR MEDICARE OP): Performed by: STUDENT IN AN ORGANIZED HEALTH CARE EDUCATION/TRAINING PROGRAM

## 2024-11-19 PROCEDURE — 2500000004 HC RX 250 GENERAL PHARMACY W/ HCPCS (ALT 636 FOR OP/ED): Performed by: PHYSICIAN ASSISTANT

## 2024-11-19 PROCEDURE — 2500000004 HC RX 250 GENERAL PHARMACY W/ HCPCS (ALT 636 FOR OP/ED): Performed by: STUDENT IN AN ORGANIZED HEALTH CARE EDUCATION/TRAINING PROGRAM

## 2024-11-19 PROCEDURE — 27829 TREAT LOWER LEG JOINT: CPT | Performed by: ORTHOPAEDIC SURGERY

## 2024-11-19 PROCEDURE — 3600000004 HC OR TIME - INITIAL BASE CHARGE - PROCEDURE LEVEL FOUR: Performed by: ORTHOPAEDIC SURGERY

## 2024-11-19 PROCEDURE — 2500000001 HC RX 250 WO HCPCS SELF ADMINISTERED DRUGS (ALT 637 FOR MEDICARE OP): Performed by: HOSPITALIST

## 2024-11-19 PROCEDURE — C1713 ANCHOR/SCREW BN/BN,TIS/BN: HCPCS | Performed by: ORTHOPAEDIC SURGERY

## 2024-11-19 PROCEDURE — 3700000002 HC GENERAL ANESTHESIA TIME - EACH INCREMENTAL 1 MINUTE: Performed by: ORTHOPAEDIC SURGERY

## 2024-11-19 PROCEDURE — 3700000001 HC GENERAL ANESTHESIA TIME - INITIAL BASE CHARGE: Performed by: ORTHOPAEDIC SURGERY

## 2024-11-19 DEVICE — IMPLANTABLE DEVICE: Type: IMPLANTABLE DEVICE | Site: ANKLE | Status: NON-FUNCTIONAL

## 2024-11-19 DEVICE — SCREW, LOW PROFILE, CORTICAL, 3.5 X 16M, SS: Type: IMPLANTABLE DEVICE | Site: ANKLE | Status: FUNCTIONAL

## 2024-11-19 DEVICE — IMPLANTABLE DEVICE: Type: IMPLANTABLE DEVICE | Site: ANKLE | Status: FUNCTIONAL

## 2024-11-19 DEVICE — SCREW, LOW PROFILE, LOCKING, 2.7MM X 14MM, SS: Type: IMPLANTABLE DEVICE | Site: ANKLE | Status: FUNCTIONAL

## 2024-11-19 DEVICE — SCREW, LOW PROFILE, CORTICAL, 3.5 X 14MM, SS: Type: IMPLANTABLE DEVICE | Site: ANKLE | Status: FUNCTIONAL

## 2024-11-19 DEVICE — K-LESS T-ROPE W/DRV, SYN REPR, SS
Type: IMPLANTABLE DEVICE | Site: ANKLE | Status: FUNCTIONAL
Brand: ARTHREX®

## 2024-11-19 RX ORDER — TALC
3 POWDER (GRAM) TOPICAL NIGHTLY PRN
Status: DISCONTINUED | OUTPATIENT
Start: 2024-11-19 | End: 2024-11-20 | Stop reason: HOSPADM

## 2024-11-19 RX ORDER — IPRATROPIUM BROMIDE 0.5 MG/2.5ML
500 SOLUTION RESPIRATORY (INHALATION) EVERY 30 MIN PRN
Status: DISCONTINUED | OUTPATIENT
Start: 2024-11-19 | End: 2024-11-19 | Stop reason: HOSPADM

## 2024-11-19 RX ORDER — OXYCODONE AND ACETAMINOPHEN 5; 325 MG/1; MG/1
1 TABLET ORAL EVERY 4 HOURS PRN
Status: DISCONTINUED | OUTPATIENT
Start: 2024-11-19 | End: 2024-11-20 | Stop reason: HOSPADM

## 2024-11-19 RX ORDER — PROPOFOL 10 MG/ML
INJECTION, EMULSION INTRAVENOUS AS NEEDED
Status: DISCONTINUED | OUTPATIENT
Start: 2024-11-19 | End: 2024-11-19

## 2024-11-19 RX ORDER — LISINOPRIL 20 MG/1
40 TABLET ORAL DAILY
Status: DISCONTINUED | OUTPATIENT
Start: 2024-11-20 | End: 2024-11-19

## 2024-11-19 RX ORDER — MEPERIDINE HYDROCHLORIDE 25 MG/ML
12.5 INJECTION INTRAMUSCULAR; INTRAVENOUS; SUBCUTANEOUS EVERY 10 MIN PRN
Status: DISCONTINUED | OUTPATIENT
Start: 2024-11-19 | End: 2024-11-19 | Stop reason: HOSPADM

## 2024-11-19 RX ORDER — LISINOPRIL 20 MG/1
40 TABLET ORAL DAILY
Status: DISCONTINUED | OUTPATIENT
Start: 2024-11-19 | End: 2024-11-20 | Stop reason: HOSPADM

## 2024-11-19 RX ORDER — MIDAZOLAM HYDROCHLORIDE 1 MG/ML
2 INJECTION, SOLUTION INTRAMUSCULAR; INTRAVENOUS ONCE
Status: COMPLETED | OUTPATIENT
Start: 2024-11-19 | End: 2024-11-19

## 2024-11-19 RX ORDER — ALBUTEROL SULFATE 0.83 MG/ML
2.5 SOLUTION RESPIRATORY (INHALATION) EVERY 30 MIN PRN
Status: DISCONTINUED | OUTPATIENT
Start: 2024-11-19 | End: 2024-11-19 | Stop reason: HOSPADM

## 2024-11-19 RX ORDER — OXYCODONE AND ACETAMINOPHEN 10; 325 MG/1; MG/1
1 TABLET ORAL EVERY 4 HOURS PRN
Status: DISCONTINUED | OUTPATIENT
Start: 2024-11-19 | End: 2024-11-19

## 2024-11-19 RX ORDER — FAMOTIDINE 10 MG/ML
20 INJECTION INTRAVENOUS ONCE
Status: COMPLETED | OUTPATIENT
Start: 2024-11-19 | End: 2024-11-19

## 2024-11-19 RX ORDER — HYDROMORPHONE HYDROCHLORIDE 2 MG/ML
INJECTION, SOLUTION INTRAMUSCULAR; INTRAVENOUS; SUBCUTANEOUS AS NEEDED
Status: DISCONTINUED | OUTPATIENT
Start: 2024-11-19 | End: 2024-11-19

## 2024-11-19 RX ORDER — NALOXONE HYDROCHLORIDE 0.4 MG/ML
0.2 INJECTION, SOLUTION INTRAMUSCULAR; INTRAVENOUS; SUBCUTANEOUS EVERY 5 MIN PRN
Status: DISCONTINUED | OUTPATIENT
Start: 2024-11-19 | End: 2024-11-20 | Stop reason: HOSPADM

## 2024-11-19 RX ORDER — FENTANYL CITRATE 50 UG/ML
100 INJECTION, SOLUTION INTRAMUSCULAR; INTRAVENOUS ONCE
Status: COMPLETED | OUTPATIENT
Start: 2024-11-19 | End: 2024-11-19

## 2024-11-19 RX ORDER — HYDRALAZINE HYDROCHLORIDE 20 MG/ML
10 INJECTION INTRAMUSCULAR; INTRAVENOUS EVERY 10 MIN PRN
Status: DISCONTINUED | OUTPATIENT
Start: 2024-11-19 | End: 2024-11-19 | Stop reason: HOSPADM

## 2024-11-19 RX ORDER — OXYCODONE AND ACETAMINOPHEN 5; 325 MG/1; MG/1
1 TABLET ORAL EVERY 4 HOURS PRN
Status: DISCONTINUED | OUTPATIENT
Start: 2024-11-19 | End: 2024-11-19

## 2024-11-19 RX ORDER — OXYCODONE AND ACETAMINOPHEN 10; 325 MG/1; MG/1
1 TABLET ORAL EVERY 4 HOURS PRN
Status: DISCONTINUED | OUTPATIENT
Start: 2024-11-19 | End: 2024-11-20 | Stop reason: HOSPADM

## 2024-11-19 RX ORDER — DIPHENHYDRAMINE HCL 25 MG
25 TABLET ORAL EVERY 6 HOURS PRN
Status: DISCONTINUED | OUTPATIENT
Start: 2024-11-19 | End: 2024-11-20 | Stop reason: HOSPADM

## 2024-11-19 RX ORDER — DEXMEDETOMIDINE HYDROCHLORIDE 4 UG/ML
INJECTION, SOLUTION INTRAVENOUS CONTINUOUS PRN
Status: DISCONTINUED | OUTPATIENT
Start: 2024-11-19 | End: 2024-11-19

## 2024-11-19 RX ORDER — SODIUM CHLORIDE, SODIUM LACTATE, POTASSIUM CHLORIDE, CALCIUM CHLORIDE 600; 310; 30; 20 MG/100ML; MG/100ML; MG/100ML; MG/100ML
INJECTION, SOLUTION INTRAVENOUS CONTINUOUS PRN
Status: DISCONTINUED | OUTPATIENT
Start: 2024-11-19 | End: 2024-11-19

## 2024-11-19 RX ORDER — HYDROCODONE BITARTRATE AND ACETAMINOPHEN 5; 325 MG/1; MG/1
1 TABLET ORAL EVERY 4 HOURS PRN
Status: DISCONTINUED | OUTPATIENT
Start: 2024-11-19 | End: 2024-11-20 | Stop reason: HOSPADM

## 2024-11-19 RX ORDER — METOCLOPRAMIDE HYDROCHLORIDE 5 MG/ML
10 INJECTION INTRAMUSCULAR; INTRAVENOUS ONCE
Status: COMPLETED | OUTPATIENT
Start: 2024-11-19 | End: 2024-11-19

## 2024-11-19 RX ORDER — SIMETHICONE 80 MG
80 TABLET,CHEWABLE ORAL 4 TIMES DAILY PRN
Status: DISCONTINUED | OUTPATIENT
Start: 2024-11-19 | End: 2024-11-20 | Stop reason: HOSPADM

## 2024-11-19 RX ORDER — ONDANSETRON HYDROCHLORIDE 2 MG/ML
4 INJECTION, SOLUTION INTRAVENOUS ONCE AS NEEDED
Status: DISCONTINUED | OUTPATIENT
Start: 2024-11-19 | End: 2024-11-19 | Stop reason: HOSPADM

## 2024-11-19 RX ORDER — ONDANSETRON HYDROCHLORIDE 2 MG/ML
4 INJECTION, SOLUTION INTRAVENOUS EVERY 4 HOURS PRN
Status: DISCONTINUED | OUTPATIENT
Start: 2024-11-19 | End: 2024-11-20 | Stop reason: HOSPADM

## 2024-11-19 RX ORDER — ENOXAPARIN SODIUM 100 MG/ML
40 INJECTION SUBCUTANEOUS DAILY
Status: DISCONTINUED | OUTPATIENT
Start: 2024-11-20 | End: 2024-11-20 | Stop reason: HOSPADM

## 2024-11-19 RX ORDER — FENTANYL CITRATE 50 UG/ML
50 INJECTION, SOLUTION INTRAMUSCULAR; INTRAVENOUS EVERY 5 MIN PRN
Status: DISCONTINUED | OUTPATIENT
Start: 2024-11-19 | End: 2024-11-19 | Stop reason: HOSPADM

## 2024-11-19 RX ORDER — DOCUSATE SODIUM 100 MG/1
100 CAPSULE, LIQUID FILLED ORAL 2 TIMES DAILY
Status: DISCONTINUED | OUTPATIENT
Start: 2024-11-19 | End: 2024-11-20 | Stop reason: HOSPADM

## 2024-11-19 RX ORDER — LIDOCAINE HYDROCHLORIDE 20 MG/ML
INJECTION, SOLUTION INFILTRATION; PERINEURAL AS NEEDED
Status: DISCONTINUED | OUTPATIENT
Start: 2024-11-19 | End: 2024-11-19

## 2024-11-19 RX ORDER — HYDROMORPHONE HYDROCHLORIDE 0.2 MG/ML
0.2 INJECTION INTRAMUSCULAR; INTRAVENOUS; SUBCUTANEOUS EVERY 5 MIN PRN
Status: DISCONTINUED | OUTPATIENT
Start: 2024-11-19 | End: 2024-11-19 | Stop reason: HOSPADM

## 2024-11-19 RX ORDER — DESMOPRESSIN ACETATE 0.2 MG/1
0.6 TABLET ORAL NIGHTLY
Status: DISCONTINUED | OUTPATIENT
Start: 2024-11-19 | End: 2024-11-19

## 2024-11-19 RX ORDER — ACETAMINOPHEN 325 MG/1
650 TABLET ORAL EVERY 6 HOURS PRN
Status: DISCONTINUED | OUTPATIENT
Start: 2024-11-19 | End: 2024-11-20 | Stop reason: HOSPADM

## 2024-11-19 RX ORDER — SODIUM CHLORIDE, SODIUM LACTATE, POTASSIUM CHLORIDE, CALCIUM CHLORIDE 600; 310; 30; 20 MG/100ML; MG/100ML; MG/100ML; MG/100ML
40 INJECTION, SOLUTION INTRAVENOUS CONTINUOUS
Status: DISCONTINUED | OUTPATIENT
Start: 2024-11-19 | End: 2024-11-19 | Stop reason: HOSPADM

## 2024-11-19 RX ORDER — TOBRAMYCIN 1.2 G/30ML
INJECTION, POWDER, LYOPHILIZED, FOR SOLUTION INTRAVENOUS AS NEEDED
Status: DISCONTINUED | OUTPATIENT
Start: 2024-11-19 | End: 2024-11-19 | Stop reason: HOSPADM

## 2024-11-19 RX ORDER — HYDROCODONE BITARTRATE AND ACETAMINOPHEN 10; 325 MG/1; MG/1
1 TABLET ORAL EVERY 4 HOURS PRN
Status: DISCONTINUED | OUTPATIENT
Start: 2024-11-19 | End: 2024-11-20 | Stop reason: HOSPADM

## 2024-11-19 RX ORDER — FENTANYL CITRATE 50 UG/ML
INJECTION, SOLUTION INTRAMUSCULAR; INTRAVENOUS AS NEEDED
Status: DISCONTINUED | OUTPATIENT
Start: 2024-11-19 | End: 2024-11-19

## 2024-11-19 RX ORDER — MIDAZOLAM HYDROCHLORIDE 1 MG/ML
INJECTION, SOLUTION INTRAMUSCULAR; INTRAVENOUS CONTINUOUS PRN
Status: DISCONTINUED | OUTPATIENT
Start: 2024-11-19 | End: 2024-11-19

## 2024-11-19 RX ORDER — LABETALOL HYDROCHLORIDE 5 MG/ML
INJECTION, SOLUTION INTRAVENOUS AS NEEDED
Status: DISCONTINUED | OUTPATIENT
Start: 2024-11-19 | End: 2024-11-19

## 2024-11-19 RX ORDER — VANCOMYCIN HYDROCHLORIDE 1 G/20ML
INJECTION, POWDER, LYOPHILIZED, FOR SOLUTION INTRAVENOUS AS NEEDED
Status: DISCONTINUED | OUTPATIENT
Start: 2024-11-19 | End: 2024-11-19 | Stop reason: HOSPADM

## 2024-11-19 RX ORDER — LABETALOL HYDROCHLORIDE 5 MG/ML
5 INJECTION, SOLUTION INTRAVENOUS EVERY 5 MIN PRN
Status: DISCONTINUED | OUTPATIENT
Start: 2024-11-19 | End: 2024-11-19 | Stop reason: HOSPADM

## 2024-11-19 RX ORDER — CEFAZOLIN SODIUM IN 0.9 % NACL 3 G/100 ML
3 INTRAVENOUS SOLUTION, PIGGYBACK (ML) INTRAVENOUS ONCE
Status: COMPLETED | OUTPATIENT
Start: 2024-11-19 | End: 2024-11-19

## 2024-11-19 RX ADMIN — MIDAZOLAM 2 MG: 1 INJECTION INTRAMUSCULAR; INTRAVENOUS at 06:58

## 2024-11-19 RX ADMIN — LISINOPRIL 40 MG: 20 TABLET ORAL at 11:38

## 2024-11-19 RX ADMIN — HYDRALAZINE HYDROCHLORIDE 10 MG: 20 INJECTION INTRAMUSCULAR; INTRAVENOUS at 09:57

## 2024-11-19 RX ADMIN — ACETAMINOPHEN 650 MG: 325 TABLET, FILM COATED ORAL at 16:34

## 2024-11-19 RX ADMIN — HYDRALAZINE HYDROCHLORIDE 10 MG: 20 INJECTION INTRAMUSCULAR; INTRAVENOUS at 10:11

## 2024-11-19 RX ADMIN — FAMOTIDINE 20 MG: 10 INJECTION, SOLUTION INTRAVENOUS at 06:44

## 2024-11-19 RX ADMIN — FENTANYL CITRATE 100 MCG: 50 INJECTION INTRAMUSCULAR; INTRAVENOUS at 06:57

## 2024-11-19 RX ADMIN — HYDROCODONE BITARTRATE AND ACETAMINOPHEN 1 TABLET: 5; 325 TABLET ORAL at 19:53

## 2024-11-19 RX ADMIN — METOCLOPRAMIDE 10 MG: 5 INJECTION, SOLUTION INTRAMUSCULAR; INTRAVENOUS at 06:44

## 2024-11-19 RX ADMIN — DOCUSATE SODIUM 100 MG: 100 CAPSULE, LIQUID FILLED ORAL at 20:00

## 2024-11-19 SDOH — SOCIAL STABILITY: SOCIAL INSECURITY: ABUSE: ADULT

## 2024-11-19 SDOH — SOCIAL STABILITY: SOCIAL INSECURITY: DO YOU FEEL UNSAFE GOING BACK TO THE PLACE WHERE YOU ARE LIVING?: NO

## 2024-11-19 SDOH — SOCIAL STABILITY: SOCIAL INSECURITY: HAVE YOU HAD THOUGHTS OF HARMING ANYONE ELSE?: NO

## 2024-11-19 SDOH — SOCIAL STABILITY: SOCIAL INSECURITY: HAS ANYONE EVER THREATENED TO HURT YOUR FAMILY OR YOUR PETS?: NO

## 2024-11-19 SDOH — SOCIAL STABILITY: SOCIAL INSECURITY: DO YOU FEEL ANYONE HAS EXPLOITED OR TAKEN ADVANTAGE OF YOU FINANCIALLY OR OF YOUR PERSONAL PROPERTY?: NO

## 2024-11-19 SDOH — SOCIAL STABILITY: SOCIAL INSECURITY: ARE YOU OR HAVE YOU BEEN THREATENED OR ABUSED PHYSICALLY, EMOTIONALLY, OR SEXUALLY BY ANYONE?: NO

## 2024-11-19 SDOH — SOCIAL STABILITY: SOCIAL INSECURITY: HAVE YOU HAD ANY THOUGHTS OF HARMING ANYONE ELSE?: NO

## 2024-11-19 SDOH — SOCIAL STABILITY: SOCIAL INSECURITY: WERE YOU ABLE TO COMPLETE ALL THE BEHAVIORAL HEALTH SCREENINGS?: YES

## 2024-11-19 SDOH — SOCIAL STABILITY: SOCIAL INSECURITY: DOES ANYONE TRY TO KEEP YOU FROM HAVING/CONTACTING OTHER FRIENDS OR DOING THINGS OUTSIDE YOUR HOME?: NO

## 2024-11-19 SDOH — SOCIAL STABILITY: SOCIAL INSECURITY: ARE THERE ANY APPARENT SIGNS OF INJURIES/BEHAVIORS THAT COULD BE RELATED TO ABUSE/NEGLECT?: NO

## 2024-11-19 ASSESSMENT — PAIN SCALES - GENERAL
PAINLEVEL_OUTOF10: 0 - NO PAIN
PAINLEVEL_OUTOF10: 0 - NO PAIN
PAINLEVEL_OUTOF10: 2
PAINLEVEL_OUTOF10: 0 - NO PAIN
PAINLEVEL_OUTOF10: 0 - NO PAIN
PAINLEVEL_OUTOF10: 4
PAINLEVEL_OUTOF10: 0 - NO PAIN
PAINLEVEL_OUTOF10: 7
PAINLEVEL_OUTOF10: 6
PAINLEVEL_OUTOF10: 6

## 2024-11-19 ASSESSMENT — PAIN - FUNCTIONAL ASSESSMENT
PAIN_FUNCTIONAL_ASSESSMENT: UNABLE TO SELF-REPORT
PAIN_FUNCTIONAL_ASSESSMENT: 0-10
PAIN_FUNCTIONAL_ASSESSMENT: UNABLE TO SELF-REPORT
PAIN_FUNCTIONAL_ASSESSMENT: 0-10
PAIN_FUNCTIONAL_ASSESSMENT: WONG-BAKER FACES

## 2024-11-19 ASSESSMENT — ACTIVITIES OF DAILY LIVING (ADL)
PATIENT'S MEMORY ADEQUATE TO SAFELY COMPLETE DAILY ACTIVITIES?: YES
FEEDING YOURSELF: INDEPENDENT
TOILETING: INDEPENDENT
BATHING: INDEPENDENT
JUDGMENT_ADEQUATE_SAFELY_COMPLETE_DAILY_ACTIVITIES: YES
DRESSING YOURSELF: INDEPENDENT
LACK_OF_TRANSPORTATION: NO
ADEQUATE_TO_COMPLETE_ADL: YES
HEARING - RIGHT EAR: FUNCTIONAL
WALKS IN HOME: NEEDS ASSISTANCE
GROOMING: INDEPENDENT
HEARING - LEFT EAR: FUNCTIONAL
ASSISTIVE_DEVICE: CRUTCHES

## 2024-11-19 ASSESSMENT — COGNITIVE AND FUNCTIONAL STATUS - GENERAL
TOILETING: A LITTLE
CLIMB 3 TO 5 STEPS WITH RAILING: A LITTLE
DRESSING REGULAR LOWER BODY CLOTHING: A LITTLE
DRESSING REGULAR UPPER BODY CLOTHING: A LITTLE
MOVING TO AND FROM BED TO CHAIR: A LITTLE
MOBILITY SCORE: 19
WALKING IN HOSPITAL ROOM: A LITTLE
WALKING IN HOSPITAL ROOM: A LITTLE
DRESSING REGULAR LOWER BODY CLOTHING: A LITTLE
PATIENT BASELINE BEDBOUND: NO
CLIMB 3 TO 5 STEPS WITH RAILING: A LITTLE
TURNING FROM BACK TO SIDE WHILE IN FLAT BAD: A LITTLE
DAILY ACTIVITIY SCORE: 20
TURNING FROM BACK TO SIDE WHILE IN FLAT BAD: A LITTLE
HELP NEEDED FOR BATHING: A LITTLE
MOVING TO AND FROM BED TO CHAIR: A LITTLE
STANDING UP FROM CHAIR USING ARMS: A LITTLE
MOVING FROM LYING ON BACK TO SITTING ON SIDE OF FLAT BED WITH BEDRAILS: A LITTLE
STANDING UP FROM CHAIR USING ARMS: A LITTLE
TOILETING: A LITTLE
DAILY ACTIVITIY SCORE: 21
HELP NEEDED FOR BATHING: A LITTLE
MOBILITY SCORE: 18

## 2024-11-19 ASSESSMENT — PATIENT HEALTH QUESTIONNAIRE - PHQ9
2. FEELING DOWN, DEPRESSED OR HOPELESS: NOT AT ALL
SUM OF ALL RESPONSES TO PHQ9 QUESTIONS 1 & 2: 0
1. LITTLE INTEREST OR PLEASURE IN DOING THINGS: NOT AT ALL

## 2024-11-19 ASSESSMENT — LIFESTYLE VARIABLES
SKIP TO QUESTIONS 9-10: 1
AUDIT-C TOTAL SCORE: 0
HOW OFTEN DO YOU HAVE A DRINK CONTAINING ALCOHOL: NEVER
AUDIT-C TOTAL SCORE: 0
HOW OFTEN DO YOU HAVE 6 OR MORE DRINKS ON ONE OCCASION: NEVER
HOW MANY STANDARD DRINKS CONTAINING ALCOHOL DO YOU HAVE ON A TYPICAL DAY: PATIENT DOES NOT DRINK

## 2024-11-19 ASSESSMENT — COLUMBIA-SUICIDE SEVERITY RATING SCALE - C-SSRS
6. HAVE YOU EVER DONE ANYTHING, STARTED TO DO ANYTHING, OR PREPARED TO DO ANYTHING TO END YOUR LIFE?: NO
2. HAVE YOU ACTUALLY HAD ANY THOUGHTS OF KILLING YOURSELF?: NO
1. IN THE PAST MONTH, HAVE YOU WISHED YOU WERE DEAD OR WISHED YOU COULD GO TO SLEEP AND NOT WAKE UP?: NO
1. IN THE PAST MONTH, HAVE YOU WISHED YOU WERE DEAD OR WISHED YOU COULD GO TO SLEEP AND NOT WAKE UP?: NO
2. HAVE YOU ACTUALLY HAD ANY THOUGHTS OF KILLING YOURSELF?: NO
6. HAVE YOU EVER DONE ANYTHING, STARTED TO DO ANYTHING, OR PREPARED TO DO ANYTHING TO END YOUR LIFE?: NO

## 2024-11-19 ASSESSMENT — PAIN DESCRIPTION - DESCRIPTORS
DESCRIPTORS: ACHING
DESCRIPTORS: HEADACHE
DESCRIPTORS: ACHING
DESCRIPTORS: ACHING

## 2024-11-19 ASSESSMENT — PAIN DESCRIPTION - ORIENTATION: ORIENTATION: LEFT

## 2024-11-19 ASSESSMENT — PAIN DESCRIPTION - LOCATION: LOCATION: ANKLE

## 2024-11-19 NOTE — ANESTHESIA PROCEDURE NOTES
Peripheral Block    Patient location during procedure: pre-op  Start time: 11/19/2024 7:00 AM  End time: 11/19/2024 7:03 AM  Reason for block: at surgeon's request and post-op pain management  Staffing  Performed: attending   Authorized by: Keshawn Zurita DO    Performed by: Keshawn Zurita DO  Preanesthetic Checklist  Completed: patient identified, IV checked, site marked, risks and benefits discussed, surgical consent, monitors and equipment checked, pre-op evaluation and timeout performed   Timeout performed at: 11/19/2024 6:56 AM  Peripheral Block  Patient position: laying flat  Prep: ChloraPrep  Patient monitoring: heart rate, cardiac monitor and continuous pulse ox  Block type: popliteal  Laterality: left  Injection technique: single-shot  Guidance: ultrasound guided  Local infiltration: ropivacaine  Infiltration strength: 0.5 %  Dose: 20 mL  Needle  Needle gauge: 22 G  Needle length: 10 cm  Needle localization: ultrasound guidance  Assessment  Injection assessment: negative aspiration for heme, no paresthesia on injection, incremental injection and local visualized surrounding nerve on ultrasound  Paresthesia pain: none  Heart rate change: no  Slow fractionated injection: no

## 2024-11-19 NOTE — PROGRESS NOTES
24 yr old male admitted extended recovery following ORIF left distal fibula with Dr. Bolanos due to traumatic ankle fracture.  Plan is home within 1-2 days with no skilled needs.    11/19/24 0892   Discharge Planning   Assistance Needed transportation   Type of Residence Private residence   Home or Post Acute Services None   Expected Discharge Disposition Home  (No skilled needs.)   Does the patient need discharge transport arranged? No   Financial Resource Strain   How hard is it for you to pay for the very basics like food, housing, medical care, and heating? Not hard   Housing Stability   In the last 12 months, was there a time when you were not able to pay the mortgage or rent on time? N   In the past 12 months, how many times have you moved where you were living? 0   At any time in the past 12 months, were you homeless or living in a shelter (including now)? N   Transportation Needs   In the past 12 months, has lack of transportation kept you from medical appointments or from getting medications? no   In the past 12 months, has lack of transportation kept you from meetings, work, or from getting things needed for daily living? No

## 2024-11-19 NOTE — NURSING NOTE
Pt BP is 153/106 mmHg at this time. Lisinopril given at 1138. MD Bello notified. MD would like to hold off on further interventions at this time and continue to monitor due to medication taking at least 6 hours to reach peak. Pt asymptomatic and resting in bed. Physical therapist will hold off on ambulating pt until vitals stable. No further orders.

## 2024-11-19 NOTE — PROGRESS NOTES
Physical Therapy                 Therapy Communication Note    Patient Name: Horace Griffin  MRN: [id number]  Department: [facility]  Room: 206/206-A  Today's Date: 11/19/2024     Discipline: Physical Therapy    Missed Visit Reason: Missed Visit Reason: Patient placed on medical hold, Other (Comment) (PT eval acknowledged and pt chart reviewed. RN reported patient is very groggy and hard to arouse post op. Pt /102 supine. PT contraindicated at this time due to hypertension. PT will assess and evaluate patient as able when appropriately cleared.)    Missed Time: Cancel    Ana Montes, PT, DPT

## 2024-11-19 NOTE — ANESTHESIA PROCEDURE NOTES
Airway  Date/Time: 11/19/2024 7:27 AM  Urgency: elective    Airway not difficult    Staffing  Performed: ANDREW   Authorized by: Keshawn Zurita DO    Performed by: ANDREW Gray  Patient location during procedure: OR    Indications and Patient Condition  Indications for airway management: anesthesia  Spontaneous Ventilation: absent  Sedation level: deep  Preoxygenated: yes  Patient position: sniffing  MILS maintained throughout  Mask difficulty assessment: 1 - vent by mask  Planned trial extubation    Final Airway Details  Final airway type: supraglottic airway      Successful airway: classic  SGA size: size 6 ambu aurastraight.     Number of attempts at approach: 1

## 2024-11-19 NOTE — CONSULTS
Inpatient consult to Medicine  Consult performed by: Amelia Bello MD  Consult ordered by: Balta Quijano PA-C          Reason For Consult  Medical management of HTN.    History Of Present Illness  Horace Griffin is a 24 y.o. male who is postop day 0 status post ORIF of the left distal fibula secondary to traumatic ankle fracture.  He underwent both general and regional anesthesia.  Postoperatively, blood pressure has been elevated.  He was just given his home dose lisinopril 40 mg about an hour ago, and his blood pressure is starting to improve.  He is asymptomatic from a hypertension standpoint.     Past Medical History  He has a past medical history of Hypertension.    Surgical History  He has no past surgical history on file.     Social History  He reports that he has never smoked. He has never used smokeless tobacco. Alcohol use questions deferred to the physician. Drug use questions deferred to the physician.    Family History  No family history on file.     Allergies  Patient has no known allergies.    Review of Systems   All other systems reviewed and are negative.       Physical Exam  Constitutional:       General: He is not in acute distress.     Appearance: Normal appearance.   HENT:      Head: Normocephalic and atraumatic.      Nose: Nose normal.      Mouth/Throat:      Mouth: Mucous membranes are moist.      Pharynx: Oropharynx is clear.   Cardiovascular:      Rate and Rhythm: Normal rate and regular rhythm.   Pulmonary:      Effort: Pulmonary effort is normal. No respiratory distress.      Breath sounds: Normal breath sounds.   Abdominal:      General: Bowel sounds are normal.      Palpations: Abdomen is soft.      Tenderness: There is no abdominal tenderness. There is no rebound.   Musculoskeletal:         General: No swelling, deformity or signs of injury.      Cervical back: Normal range of motion and neck supple.   Skin:     General: Skin is warm and dry.   Neurological:      General: No focal  deficit present.      Mental Status: He is alert and oriented to person, place, and time. Mental status is at baseline.   Psychiatric:         Attention and Perception: Attention and perception normal.         Mood and Affect: Mood normal.         Behavior: Behavior normal.         Judgment: Judgment normal.          Last Recorded Vitals  BP (!) 153/106 (BP Location: Left arm, Patient Position: Lying) Comment: MD notified  Pulse 81   Temp 36.1 °C (97 °F) (Temporal)   Resp 16   Wt (!) 170 kg (374 lb 12.5 oz)   SpO2 99%     Relevant Results  MR ankle left wo IV contrast    Result Date: 11/18/2024  Interpreted By:  Ruben Ariza  and Ken Tomas STUDY: MRI of the  left ankle without IV contrast;  11/18/2024 9:59 am   INDICATION: Signs/Symptoms:left ankle injury.   ,S99.912A Unspecified injury of left ankle, initial encounter   COMPARISON: None.   ACCESSION NUMBER(S): [id number]   ORDERING CLINICIAN: KIA MI   TECHNIQUE: MR imaging of the  left ankle was obtained  without administration of intravenous contrast medium.   FINDINGS: TENDONS: The extensor tendons are intact and demonstrate a normal course. The flexor tendons are intact and demonstrate a normal course. The peroneal tendons are intact and demonstrate a normal course. The Achilles tendon is intact. The plantar aponeurosis is intact.   LIGAMENTS: There is T2 hyperintense signal abnormality with indistinctness of the anterior inferior tibiofibular ligament. There is also hyperintense T2 signal within the tibiofibular syndesmosis. Similarly, there is indistinctness with T2 hyperintense signal abnormality of the deep and superficial components of the deltoid ligament with widening of the medial clear space. The posterior tibiofibular ligament is intact. The anterior talofibular, calcaneofibular, and posterior talofibular ligaments are intact. The spring ligament is intact.   JOINTS: There is widening of the medial clear space of the tibiotalar  articulation. There is no joint effusion. The articular cartilage of the ankle joint is normal. There is no osteochondral defect in the talar dome.   OSSEOUS STRUCTURES: There is an acute mildly comminuted and minimally displaced fracture of the distal fibular diaphysis with extension of the fracture line into the tibiofibular syndesmosis.   SOFT TISSUES: There is extensive subcutaneous soft tissue edema noted throughout the superficial fascia of the left lower leg and ankle extending into the deep myofascial planes. T2 hyperintense/T1 isointense fluid surrounding the medial and lateral ankle may represent hemorrhagic products. There is no muscle atrophy or tear. The tarsal tunnel is normal. The sinus tarsi is normal with preservation of fat signal.       1. Acute comminuted fracture of the distal fibular diaphysis with extension of the fracture line into the syndesmosis (Maisonneuve fracture) with high-grade sprain of the anterior-inferior tibiofibular ligament and of the deltoid ligaments and associated widening of the medial tibiotalar clear space. 2. Focal soft tissue edema and hematoma overlying the medial malleolus.     I personally reviewed the images/study and I agree with the findings as stated. This study was interpreted at [facility], [place], [place].   MACRO: Ruben Ariza discussed the significance and urgency of this critical finding by telephone with  KIA MI on 11/18/2024 at 11:23 am.  (**-RCF-**) Findings:  See findings.     Signed by: Ruben Ariza 11/18/2024 11:23 AM Dictation workstation:   [id number]       Assessment/Plan     Distal fibular fracture  Management per primary  PT/OT  Pain control, bowel regimen  Antibiotic and DVT prophylaxis per primary  HTN  Continue home lisinopril  May need to add additional IV antihypertensives PRN; continue to monitor response    Amelia Bello MD

## 2024-11-19 NOTE — H&P
"History Of Present Illness  Horace Griffin is a 24 y.o. male presenting for left ankle surgery.  He suffered a traumatic left ankle fracture with football related activities.     Past Medical History  He has a past medical history of Hypertension.    Surgical History  History of right knee MCL repair.     Social History  He reports that he has never smoked. He has never used smokeless tobacco. Alcohol use questions deferred to the physician. Drug use questions deferred to the physician.    Family History  No family history on file.     Allergies  Patient has no known allergies.    Review of Systems   All other systems reviewed and are negative.       Physical Exam  Constitutional:       Appearance: Normal appearance.   Cardiovascular:      Rate and Rhythm: Normal rate and regular rhythm.   Pulmonary:      Effort: Pulmonary effort is normal.   Neurological:      Mental Status: He is alert.     Left lower extremity splint in place.  Skin intact with mild swelling.  Intact neurovascular exam.  Lower extremity compartments soft and supple.  Positive tenderness over the distal fibula.     Last Recorded Vitals  Blood pressure (!) 171/120, pulse 80, temperature 36.4 °C (97.5 °F), temperature source Temporal, resp. rate 16, height 2.032 m (6' 8\"), weight (!) 170 kg (375 lb), SpO2 95%.    Relevant Results    X-ray and MRI of the left ankle are consistent with a Moreno C ankle fracture with syndesmosis disruption.  Partial injury to the medial deltoid.      Scheduled medications  ceFAZolin, 3 g, intravenous, Once  fentaNYL, 100 mcg, intravenous, Once  midazolam, 2 mg, intravenous, Once  midazolam, 2 mg, intravenous, Once      Continuous medications  Ozempic       Assessment/Plan   We reviewed his findings of a Moreno C ankle fracture with syndesmosis disruption and partial deltoid injury.  Articular cartilage appears grossly maintained.  I discussed with the patient and his mother the plan for open reduction internal fixation of " the left distal fibula with the addition of tight rope fixation devices to secure the syndesmosis.  Diagnostic arthroscopy of the ankle will be performed with potential repair of the deltoid as indicated.  I will be assisted by my trauma trained partner, Dr. Gusman with the case.  I reviewed the risks of the surgery again with him including bleeding, infection, wound healing issues, venous thromboembolism, failure of the fracture to heal, hardware related complications, progressive degenerative changes, and difficulty returning to preinjury level of football performance.  We discussed postoperative DVT prophylaxis.  After risks and benefits were discussed I obtained consent to proceed.

## 2024-11-19 NOTE — PROGRESS NOTES
Physical Therapy                 Therapy Communication Note    Patient Name: Horace Griffin  MRN: [id number]  Department: [facility]  Room: 206/206-A  Today's Date: 11/19/2024     Discipline: Physical Therapy    Missed Visit Reason: Missed Visit Reason: Patient placed on medical hold, Other (Comment) (SECOND ATTEMPT: Patient continues to be held for PT evaluation due to unsafe hypertension that is contraindicated for PT: 159/104 mmHg. PT waiting for patient to be appropriate for PT participation and to receive medical clearance from RN.)    Missed Time: Cancel    Ana Montes, PT, DPT

## 2024-11-19 NOTE — ANESTHESIA POSTPROCEDURE EVALUATION
Patient: Horace Griffin    Procedure Summary       Date: 11/19/24 Room / Location: [facility] OR [facility] / [facility]    Anesthesia Start: 0715 Anesthesia Stop: 0938    Procedure: *FIRST CASE, 23 HOUR* LEFT Open Reduction Internal Fixation Distal Fibula, tight rope fixation, diagnostic arthroscopy (LMA/PNB, FLUORO) (Arthrex Fibula Fx set, tightropes, Athrex nanoscope, FLUORO, 5 inch plaster splint material) (Left: Ankle) Diagnosis:       Traumatic closed displaced fracture of distal fibula      (Traumatic closed displaced fracture of distal fibula [S82.839A])    Surgeons: Nickolas Bolanos MD Responsible Provider: Keshawn Zurita DO    Anesthesia Type: general, regional ASA Status: 2            Anesthesia Type: general, regional    Vitals Value Taken Time   /104 11/19/24 0940   Temp 36.2 11/19/24 0940   Pulse 83 11/19/24 0940   Resp 14 11/19/24 0940   SpO2 100 11/19/24 0940     Patient instructed to resume BP medications when discharged, and to follow up with PCP to get BP under better control.  Pt confirmed understanding instructions.    Anesthesia Post Evaluation    Patient location during evaluation: PACU  Patient participation: complete - patient participated  Level of consciousness: sleepy but conscious  Pain management: adequate  Airway patency: patent  Cardiovascular status: acceptable and blood pressure returned to baseline  Respiratory status: BIPAP  Hydration status: acceptable  Postoperative Nausea and Vomiting: none  Comments: Attending notified of BP.      No notable events documented.

## 2024-11-19 NOTE — NURSING NOTE
Pt medicated with PRN Tylenol outside of ordered parameters by request. 650 mg of Tylenol given for 6/10 pain.

## 2024-11-19 NOTE — PROGRESS NOTES
Respiratory Therapy Note  RT to pts room to instruct on IS. Pt is still very sleepy and hard to keep awake. Will attempt IS later today.

## 2024-11-19 NOTE — PERIOPERATIVE NURSING NOTE
Attending heather approved DC to floor. Report received by Marianna.  Pt tx'd via bed accompanied by PACU Rns x's 2.

## 2024-11-19 NOTE — PERIOPERATIVE NURSING NOTE
Attending anesthesia @ bedside. Pt assessed. Requested pt be medicated with hydralazine for hypertension.  Pt remains drowsy, but appropriate. Tolerating ice chips.

## 2024-11-19 NOTE — PERIOPERATIVE NURSING NOTE
Pt received from OR via bed, monitors on, report received. Pt hypertensive, anesthesia aware, baseline. Oral airway removed by anesthesia. Pt repositioned for optimal air exchange.   Assessment completed as documented, operative extremity elevated, orders requested.

## 2024-11-19 NOTE — PERIOPERATIVE NURSING NOTE
Anesthesia updated on pt status, order to continue to medicate until diastolic is under 110.   Concerns and judgement communicated to Attending regarding continuing to medicate when pt is baseline. Attending acknowledged.

## 2024-11-20 ENCOUNTER — PHARMACY VISIT (OUTPATIENT)
Dept: PHARMACY | Facility: CLINIC | Age: 24
End: 2024-11-20
Payer: COMMERCIAL

## 2024-11-20 VITALS
RESPIRATION RATE: 16 BRPM | TEMPERATURE: 97 F | WEIGHT: 315 LBS | SYSTOLIC BLOOD PRESSURE: 170 MMHG | DIASTOLIC BLOOD PRESSURE: 98 MMHG | HEART RATE: 87 BPM | HEIGHT: 78 IN | BODY MASS INDEX: 36.45 KG/M2 | OXYGEN SATURATION: 100 %

## 2024-11-20 PROBLEM — S82.839A TRAUMATIC CLOSED DISPLACED FRACTURE OF DISTAL FIBULA: Status: RESOLVED | Noted: 2024-11-18 | Resolved: 2024-11-20

## 2024-11-20 PROBLEM — S82.62XA CLOSED DISPLACED FRACTURE OF LATERAL MALLEOLUS OF LEFT FIBULA, INITIAL ENCOUNTER: Status: RESOLVED | Noted: 2024-11-19 | Resolved: 2024-11-20

## 2024-11-20 LAB
ANION GAP SERPL CALC-SCNC: 14 MMOL/L (ref 10–20)
BASOPHILS # BLD AUTO: 0.01 X10*3/UL (ref 0–0.1)
BASOPHILS NFR BLD AUTO: 0.1 %
BUN SERPL-MCNC: 18 MG/DL (ref 6–23)
CALCIUM SERPL-MCNC: 9.5 MG/DL (ref 8.6–10.3)
CHLORIDE SERPL-SCNC: 102 MMOL/L (ref 98–107)
CO2 SERPL-SCNC: 26 MMOL/L (ref 21–32)
CREAT SERPL-MCNC: 1.31 MG/DL (ref 0.5–1.3)
EGFRCR SERPLBLD CKD-EPI 2021: 78 ML/MIN/1.73M*2
EOSINOPHIL # BLD AUTO: 0.06 X10*3/UL (ref 0–0.7)
EOSINOPHIL NFR BLD AUTO: 0.4 %
ERYTHROCYTE [DISTWIDTH] IN BLOOD BY AUTOMATED COUNT: 12.6 % (ref 11.5–14.5)
GLUCOSE SERPL-MCNC: 106 MG/DL (ref 74–99)
HCT VFR BLD AUTO: 43.2 % (ref 41–52)
HGB BLD-MCNC: 14.7 G/DL (ref 13.5–17.5)
IMM GRANULOCYTES # BLD AUTO: 0.04 X10*3/UL (ref 0–0.7)
IMM GRANULOCYTES NFR BLD AUTO: 0.2 % (ref 0–0.9)
LYMPHOCYTES # BLD AUTO: 2.13 X10*3/UL (ref 1.2–4.8)
LYMPHOCYTES NFR BLD AUTO: 12.7 %
MCH RBC QN AUTO: 29.2 PG (ref 26–34)
MCHC RBC AUTO-ENTMCNC: 34 G/DL (ref 32–36)
MCV RBC AUTO: 86 FL (ref 80–100)
MONOCYTES # BLD AUTO: 1.46 X10*3/UL (ref 0.1–1)
MONOCYTES NFR BLD AUTO: 8.7 %
NEUTROPHILS # BLD AUTO: 13.1 X10*3/UL (ref 1.2–7.7)
NEUTROPHILS NFR BLD AUTO: 77.9 %
NRBC BLD-RTO: ABNORMAL /100{WBCS}
PLATELET # BLD AUTO: 299 X10*3/UL (ref 150–450)
POTASSIUM SERPL-SCNC: 3.9 MMOL/L (ref 3.5–5.3)
RBC # BLD AUTO: 5.04 X10*6/UL (ref 4.5–5.9)
SODIUM SERPL-SCNC: 138 MMOL/L (ref 136–145)
WBC # BLD AUTO: 16.8 X10*3/UL (ref 4.4–11.3)

## 2024-11-20 PROCEDURE — 2500000001 HC RX 250 WO HCPCS SELF ADMINISTERED DRUGS (ALT 637 FOR MEDICARE OP): Performed by: PHYSICIAN ASSISTANT

## 2024-11-20 PROCEDURE — 96372 THER/PROPH/DIAG INJ SC/IM: CPT | Performed by: PHYSICIAN ASSISTANT

## 2024-11-20 PROCEDURE — 7100000011 HC EXTENDED STAY RECOVERY HOURLY - NURSING UNIT

## 2024-11-20 PROCEDURE — 97116 GAIT TRAINING THERAPY: CPT | Mod: GP

## 2024-11-20 PROCEDURE — 80048 BASIC METABOLIC PNL TOTAL CA: CPT | Performed by: HOSPITALIST

## 2024-11-20 PROCEDURE — 36415 COLL VENOUS BLD VENIPUNCTURE: CPT | Performed by: HOSPITALIST

## 2024-11-20 PROCEDURE — RXMED WILLOW AMBULATORY MEDICATION CHARGE

## 2024-11-20 PROCEDURE — 99232 SBSQ HOSP IP/OBS MODERATE 35: CPT | Performed by: STUDENT IN AN ORGANIZED HEALTH CARE EDUCATION/TRAINING PROGRAM

## 2024-11-20 PROCEDURE — 2500000004 HC RX 250 GENERAL PHARMACY W/ HCPCS (ALT 636 FOR OP/ED): Performed by: PHYSICIAN ASSISTANT

## 2024-11-20 PROCEDURE — 2500000001 HC RX 250 WO HCPCS SELF ADMINISTERED DRUGS (ALT 637 FOR MEDICARE OP): Performed by: STUDENT IN AN ORGANIZED HEALTH CARE EDUCATION/TRAINING PROGRAM

## 2024-11-20 PROCEDURE — 97161 PT EVAL LOW COMPLEX 20 MIN: CPT | Mod: GP

## 2024-11-20 PROCEDURE — 85025 COMPLETE CBC W/AUTO DIFF WBC: CPT | Performed by: HOSPITALIST

## 2024-11-20 RX ORDER — OXYCODONE AND ACETAMINOPHEN 5; 325 MG/1; MG/1
1 TABLET ORAL EVERY 6 HOURS PRN
Qty: 20 TABLET | Refills: 0 | Status: SHIPPED | OUTPATIENT
Start: 2024-11-20

## 2024-11-20 RX ORDER — HYDRALAZINE HYDROCHLORIDE 20 MG/ML
10 INJECTION INTRAMUSCULAR; INTRAVENOUS EVERY 4 HOURS PRN
Status: DISCONTINUED | OUTPATIENT
Start: 2024-11-20 | End: 2024-11-20 | Stop reason: HOSPADM

## 2024-11-20 RX ORDER — DOCUSATE SODIUM 100 MG/1
100 CAPSULE, LIQUID FILLED ORAL 2 TIMES DAILY
Qty: 30 CAPSULE | Refills: 0 | Status: SHIPPED | OUTPATIENT
Start: 2024-11-20 | End: 2024-12-05

## 2024-11-20 RX ORDER — ONDANSETRON 4 MG/1
4 TABLET, FILM COATED ORAL EVERY 8 HOURS PRN
Qty: 20 TABLET | Refills: 0 | Status: SHIPPED | OUTPATIENT
Start: 2024-11-20

## 2024-11-20 RX ORDER — ENOXAPARIN SODIUM 100 MG/ML
40 INJECTION SUBCUTANEOUS 2 TIMES DAILY
Qty: 5 EACH | Refills: 0 | Status: SHIPPED | OUTPATIENT
Start: 2024-11-20

## 2024-11-20 RX ADMIN — LISINOPRIL 40 MG: 20 TABLET ORAL at 08:23

## 2024-11-20 RX ADMIN — DOCUSATE SODIUM 100 MG: 100 CAPSULE, LIQUID FILLED ORAL at 08:23

## 2024-11-20 RX ADMIN — ENOXAPARIN SODIUM 40 MG: 40 INJECTION SUBCUTANEOUS at 08:23

## 2024-11-20 ASSESSMENT — PAIN - FUNCTIONAL ASSESSMENT
PAIN_FUNCTIONAL_ASSESSMENT: 0-10

## 2024-11-20 ASSESSMENT — PAIN SCALES - GENERAL
PAINLEVEL_OUTOF10: 0 - NO PAIN

## 2024-11-20 NOTE — PROGRESS NOTES
Horace Griffin is a 24 y.o. male on day 0 of admission presenting with Traumatic closed displaced fracture of distal fibula.      Subjective   No acute events overnight. BP remains elevated but patient is asymptomatic; states cuff is too small. Pain is well-controlled. No other acute complaints.       Objective     Last Recorded Vitals  BP (!) 192/96 (BP Location: Right arm, Patient Position: Lying)   Pulse 87   Temp 36.1 °C (97 °F) (Temporal)   Resp 16   Wt (!) 170 kg (374 lb 12.5 oz)   SpO2 100%   Intake/Output last 3 Shifts:    Intake/Output Summary (Last 24 hours) at 11/20/2024 0859  Last data filed at 11/19/2024 2000  Gross per 24 hour   Intake 1640 ml   Output 1550 ml   Net 90 ml       Admission Weight  Weight: (!) 170 kg (375 lb) (11/19/24 0600)    Daily Weight  11/19/24 : (!) 170 kg (374 lb 12.5 oz)    Image Results  FL fluoro images no charge  These images are not reportable by radiology and will not be interpreted   by  Radiologists.      Physical Exam  Constitutional:       General: He is not in acute distress.     Appearance: Normal appearance.   HENT:      Head: Normocephalic and atraumatic.      Nose: Nose normal.      Mouth/Throat:      Mouth: Mucous membranes are moist.      Pharynx: Oropharynx is clear.   Cardiovascular:      Rate and Rhythm: Normal rate and regular rhythm.   Pulmonary:      Effort: Pulmonary effort is normal. No respiratory distress.      Breath sounds: Normal breath sounds.   Abdominal:      General: Bowel sounds are normal.      Palpations: Abdomen is soft.      Tenderness: There is no abdominal tenderness. There is no rebound.   Musculoskeletal:         General: No swelling, deformity or signs of injury.      Cervical back: Normal range of motion and neck supple.   Skin:     General: Skin is warm and dry.   Neurological:      General: No focal deficit present.      Mental Status: He is alert and oriented to person, place, and time. Mental status is at baseline.    Psychiatric:         Attention and Perception: Attention and perception normal.         Mood and Affect: Mood normal.         Behavior: Behavior normal.         Judgment: Judgment normal.         Relevant Results             Results for orders placed or performed during the hospital encounter of 11/19/24 (from the past 24 hours)   CBC and Auto Differential   Result Value Ref Range    WBC 16.8 (H) 4.4 - 11.3 x10*3/uL    nRBC      RBC 5.04 4.50 - 5.90 x10*6/uL    Hemoglobin 14.7 13.5 - 17.5 g/dL    Hematocrit 43.2 41.0 - 52.0 %    MCV 86 80 - 100 fL    MCH 29.2 26.0 - 34.0 pg    MCHC 34.0 32.0 - 36.0 g/dL    RDW 12.6 11.5 - 14.5 %    Platelets 299 150 - 450 x10*3/uL    Neutrophils % 77.9 40.0 - 80.0 %    Immature Granulocytes %, Automated 0.2 0.0 - 0.9 %    Lymphocytes % 12.7 13.0 - 44.0 %    Monocytes % 8.7 2.0 - 10.0 %    Eosinophils % 0.4 0.0 - 6.0 %    Basophils % 0.1 0.0 - 2.0 %    Neutrophils Absolute 13.10 (H) 1.20 - 7.70 x10*3/uL    Immature Granulocytes Absolute, Automated 0.04 0.00 - 0.70 x10*3/uL    Lymphocytes Absolute 2.13 1.20 - 4.80 x10*3/uL    Monocytes Absolute 1.46 (H) 0.10 - 1.00 x10*3/uL    Eosinophils Absolute 0.06 0.00 - 0.70 x10*3/uL    Basophils Absolute 0.01 0.00 - 0.10 x10*3/uL   Basic Metabolic Panel   Result Value Ref Range    Glucose 106 (H) 74 - 99 mg/dL    Sodium 138 136 - 145 mmol/L    Potassium 3.9 3.5 - 5.3 mmol/L    Chloride 102 98 - 107 mmol/L    Bicarbonate 26 21 - 32 mmol/L    Anion Gap 14 10 - 20 mmol/L    Urea Nitrogen 18 6 - 23 mg/dL    Creatinine 1.31 (H) 0.50 - 1.30 mg/dL    eGFR 78 >60 mL/min/1.73m*2    Calcium 9.5 8.6 - 10.3 mg/dL       Assessment/Plan                  Assessment & Plan  HTN (hypertension)    Distal fibular fracture  Management per primary  PT/OT  Pain control, bowel regimen  Antibiotic and DVT prophylaxis per primary  Stable for discharge  HTN  Not at goal  Continue lisinopril home med  Recommend treating with thiazide diuretic and/or CCB per JNC-8  guidelines; patient will need to follow up with his PCP outpatient              Amelia Bello MD

## 2024-11-20 NOTE — DISCHARGE SUMMARY
Discharge Diagnosis  Traumatic closed displaced fracture of distal fibula    Issues Requiring Follow-Up  Hypertension. Follow up with PCP.     Test Results Pending At Discharge  Pending Labs       No current pending labs.            Hospital Course  Horace was admitted for surgery. He tolerated the procedure well. Post-operatively, his pain was controlled. He tolerated a regular diet. He was voiding without issue. He was discharged home in stable condition.     Pertinent Physical Exam At Time of Discharge  Physical Exam    LLE: splint in place.   Sensation returning.   Intact EHL/FHL  Brisk cap refill in the toes.     Home Medications     Medication List      START taking these medications     docusate sodium 100 mg capsule; Commonly known as: Colace; Take 1   capsule (100 mg) by mouth 2 times a day for 15 days.   Eliquis 5 mg tablet; Generic drug: apixaban; Take 1 tablet (5 mg) by   mouth 2 times a day. Do not start before November 23, 2024 after   completing enoxaparin injections.; Start taking on: November 23, 2024   enoxaparin 40 mg/0.4 mL syringe; Commonly known as: Lovenox; Inject 0.4   mL (40 mg) under the skin 2 times a day.   ondansetron 4 mg tablet; Commonly known as: Zofran; Take 1 tablet (4 mg)   by mouth every 8 hours if needed for nausea or vomiting.   oxyCODONE-acetaminophen 5-325 mg tablet; Commonly known as: Percocet;   Take 1 tablet by mouth every 6 hours if needed for severe pain (7 - 10).     CONTINUE taking these medications     benzoyl peroxide 10 % external wash; Commonly known as: Benzac AC; APPLY   TO AFFECTED AREA TWICE A DAY   lisinopril 40 mg tablet; Take 1 tablet (40 mg) by mouth once daily.   ondansetron ODT 8 mg disintegrating tablet; Commonly known as:   Zofran-ODT; Take 1 tablet (8 mg) by mouth every 8 hours if needed for   nausea or vomiting.   tirzepatide 15 mg/0.5 mL pen injector; Inject 15 mg under the skin every   7 days.     STOP taking these medications     celecoxib 200 mg  capsule; Commonly known as: CeleBREX   desmopressin 0.2 mg tablet; Commonly known as: DDAVP       Outpatient Follow-Up  No future appointments.    Jean Santillan MD

## 2024-11-20 NOTE — NURSING NOTE
Resting in bed with call light within reach and bed alarm on.  Voices adequate pain control at this time.  LLE remains elevated on pillows.  Voices no concerns at this time.

## 2024-11-20 NOTE — NURSING NOTE
Resting in bed with eyes closed and call light within reach.  Respirations even and unlabored on room air.

## 2024-11-20 NOTE — NURSING NOTE
Assumed care of patient.  Patient was awake until 4a, is currently sleeping. Breathing is even and unlabored.  Patient has had elevated SBP since admission. Does have a PRN of hydrazaline. Mom is at bedside. Patient is eager to discharge home. Awaiting MD orders for discharge.

## 2024-11-20 NOTE — NURSING NOTE
Patient was discharged home, transported by his team.  Reviewed discharge instructions with patient and his mom. Reviewed in detail the lovenox and eliquis bridge in medications, they both acknowledged understanding.  Medications are getting picked up at [facility].  Patient was escorted to car via  by nursing staff.  Patient is very appreciative of all the care received during his stay.

## 2024-11-20 NOTE — NURSING NOTE
Dr. Hernandez messaged regarding blood pressure of 172/91, order received for PRN hydralazine.  Blood pressure does not meet parameters at this time, will continue to monitor.

## 2024-11-20 NOTE — NURSING NOTE
Up ambulating in room with stand-by assist x2 and crutches.  Non-weight bearing to LLE.  Tolerated activity well.  Sat up in chair while bed linens and gown changed d/t sweating.  Assisted back to bed with call light within reach and bed alarm on.  Mother at bedside.  LLE elevated on pillows and ice pack to left ankle for comfort.  Voices no concerns at this time.

## 2024-11-20 NOTE — DISCHARGE INSTRUCTIONS
Nickolas Bolanos M.D.  Orthopedics  [address] [place] [address]  Office: [phone]    POST OPERATIVE INSTRUCTIONS FOR ORIF ANKLE    General Anesthesia or Sedation  You have been given medications that affect your balance and coordination for 24 hours.  Go directly home from the hospital and rest for the remainder of the day.  Have a responsible adult stay with you today and overnight.  Do not drive or operate equipment, conduct business or sign any legal documents for the next 24 hours or while taking pain medication.  Do not drink alcohol, take tranquilizers or sleeping pills for the next 24 hours or while taking pain medication.  Deep breathe and cough two times at least every 4 hours today and tomorrow while you are awake. This will help keep fevers away.   Use your CPAP or BiPAP machine whenever sleeping during the day or night.    Diet  Start a light meal and advance to your regular diet as tolerated    Dressing/Wound Care  Keep your splint clean and dry until seen in the office or by physical therapy  Your dressing and splint will be removed at your first post op appointment.  Please bring your CAM boot with you to your first appointment. If you do not have one, one will be provided for you at that visit.    Showering/Bathing  You may take a sponge bath or shower by protecting the splint with a cast guard or plastic bag.  You may shower fully once the splint has been removed. Cover the incisions with waterproof band aids until the sutures are removed.  Allow soap and water to gently run over the operative area and pat dry when covered until incision is fully healed.       Activity and Exercise  Wear your splint until follow up appointment.  Your weight bearing status until your follow up appointment is:  Non-weight bearing - operative extremity may not bear any weight and you must use crutches at all times.     **Physical Therapy will be addressed at your first post op appointment.  .    Ice/Polar Care  Apply an ice pack every 2 hours for 20 - 30 minutes while awake for the first 2 - 3 days.  Then 3 - 5 times a day for 20 minutes until seen by your surgeon. Begin this after the splint has been removed.  Never place an ice pack directly on skin.  Always have a barrier such as a towel between the ice pack and your skin.    Elevation  Elevating your operative extremity will lessen swelling and discomfort.  The extremity should be elevated on pillows whenever at rest for the first 10-14 days.     Support Hose  If you were provided with support hose, please wear them.  You may take the hose off to hand wash and air dry, do not place them in the washer or dryer.  You will need to wear the support hose for two weeks.      Medications  Pain medications should be taken with food.  You may experience dizziness or drowsiness while taking your prescribed pain medication.   DO NOT DRIVE!  DO NOT DRINK ALCOHOL!  Pain medication can be constipating, drink plenty of fluids and increase your fiber intake to avoid this problem.  If you develop constipation, Colace is an over the counter stool softener you may use.  You were prescribed two medications to help prevent blood clots. The first is an injection called Lovenox. Do the first dose tonight when home and then twice daily for two more days. On day 3 you will begin Eliquis twice daily for 2 weeks.   New Take Home Medications - Take as directed on bottle.    Resume your prescription medications as directed by your physician.    Do not take other medications containing Tylenol while on your pain medications.    When To Notify Your Physician  Persistent temperature greater than 101°F.  Increase or severe pain that does not respond to elevation, ice or pain medication.  Unexplained redness, swelling, numbness or tingling that does not improve with elevation or ice.  Increased amount or change in color of drainage.  Persistent nausea and vomiting.  Fingers and  toes should remain pink and warm.  Notify your doctor if they become cool, grey or numb.  If you cannot reach your surgeon, seek care at an Urgent Care Center or Emergency Room.      For questions or concerns regarding your care please contact your surgeon      Dr. Nickolas Bolanos    [phone]   Magno Quijano PA-C    [phone]   After hours and weekends   [phone]    Post Operative Appointment:  Will be arranged by team athletic trainers.     PLEASE NOTE:  Additional information and instruction will be provided by your physician regarding your surgical procedure and continued care at your initial post operative office appointment

## 2024-11-20 NOTE — NURSING NOTE
Resting in bed with call light within reach and bed in lowest/locked position.  Bed alarm on.  Splint with fluff dressing intact to LLE/ankle is clean, dry, and intact.  LLE elevated on pillows.  Ice packs to left ankle for comfort.  Medicated with [place] for c/o left ankle surgical pain.

## 2024-11-20 NOTE — PROGRESS NOTES
"Horace Griffin is a 24 y.o. male on day 0 of admission presenting with Traumatic closed displaced fracture of distal fibula.    POD1 s/p left ankle arthroscopy and ORIF with Sonny Bolanos and Uzma.     Subjective   Hypertensive overnight. No acute events overnight. Pain is controlled. Block is starting to wear off. Would like to go home. Denies chest pain and SOB.        Objective     Physical Exam  LLE: splint in place.   Intact EHL/FHL. Wiggles toes  Sensation is beginning to return.   Foot warm and well perfused.     Last Recorded Vitals  Blood pressure (!) 192/96, pulse 87, temperature 36.1 °C (97 °F), temperature source Temporal, resp. rate 16, height 2.032 m (6' 8\"), weight (!) 170 kg (374 lb 12.5 oz), SpO2 100%.  Intake/Output last 3 Shifts:  I/O last 3 completed shifts:  In: 1640 (9.6 mL/kg) [P.O.:740; I.V.:900 (5.3 mL/kg)]  Out: 1550 (9.1 mL/kg) [Urine:1550 (0.3 mL/kg/hr)]  Weight: 170 kg     Relevant Results      Scheduled medications  docusate sodium, 100 mg, oral, BID  enoxaparin, 40 mg, subcutaneous, Daily  lisinopril, 40 mg, oral, Daily      Continuous medications  oxygen, 2 L/min      PRN medications  PRN medications: acetaminophen, benzocaine-menthol, diphenhydrAMINE, hydrALAZINE, HYDROcodone-acetaminophen, HYDROcodone-acetaminophen, lubricating eye drops, melatonin, naloxone, ondansetron, oxyCODONE-acetaminophen, oxyCODONE-acetaminophen, simethicone, sodium chloride  Results for orders placed or performed during the hospital encounter of 11/19/24 (from the past 24 hours)   CBC and Auto Differential   Result Value Ref Range    WBC 16.8 (H) 4.4 - 11.3 x10*3/uL    nRBC      RBC 5.04 4.50 - 5.90 x10*6/uL    Hemoglobin 14.7 13.5 - 17.5 g/dL    Hematocrit 43.2 41.0 - 52.0 %    MCV 86 80 - 100 fL    MCH 29.2 26.0 - 34.0 pg    MCHC 34.0 32.0 - 36.0 g/dL    RDW 12.6 11.5 - 14.5 %    Platelets 299 150 - 450 x10*3/uL    Neutrophils % 77.9 40.0 - 80.0 %    Immature Granulocytes %, Automated 0.2 0.0 - 0.9 %    " Lymphocytes % 12.7 13.0 - 44.0 %    Monocytes % 8.7 2.0 - 10.0 %    Eosinophils % 0.4 0.0 - 6.0 %    Basophils % 0.1 0.0 - 2.0 %    Neutrophils Absolute 13.10 (H) 1.20 - 7.70 x10*3/uL    Immature Granulocytes Absolute, Automated 0.04 0.00 - 0.70 x10*3/uL    Lymphocytes Absolute 2.13 1.20 - 4.80 x10*3/uL    Monocytes Absolute 1.46 (H) 0.10 - 1.00 x10*3/uL    Eosinophils Absolute 0.06 0.00 - 0.70 x10*3/uL    Basophils Absolute 0.01 0.00 - 0.10 x10*3/uL   Basic Metabolic Panel   Result Value Ref Range    Glucose 106 (H) 74 - 99 mg/dL    Sodium 138 136 - 145 mmol/L    Potassium 3.9 3.5 - 5.3 mmol/L    Chloride 102 98 - 107 mmol/L    Bicarbonate 26 21 - 32 mmol/L    Anion Gap 14 10 - 20 mmol/L    Urea Nitrogen 18 6 - 23 mg/dL    Creatinine 1.31 (H) 0.50 - 1.30 mg/dL    eGFR 78 >60 mL/min/1.73m*2    Calcium 9.5 8.6 - 10.3 mg/dL                            Assessment/Plan   Assessment & Plan  Traumatic closed displaced fracture of distal fibula    HTN (hypertension)    Closed displaced fracture of lateral malleolus of left fibula, initial encounter    POD1 s/p left ankle arthroscopy and ORIF with Sonny Bolanos and Uzma.     Hypertension- continue lisinopril. PRN hydralazine as needed per medicine.     Activity: PT/OT  Antibiotics: harpreet-op Ancef  Diet: regular  DVT Ppx: weight based lovenox x3 days starting POD1. Then Eliquis 2.5mg BID for four weeks.   Weight Bearing: NWB LLE.   Dispo: likely home later this morning.   Follow Up: in one week from surgery for splint exchange.        I spent 15 minutes in the professional and overall care of this patient.      Jean Santillan MD

## 2024-11-20 NOTE — PROGRESS NOTES
Patient Name: Horace Griffin  MRN: [id number]  Department:   Room: [address] [place] [address]  Today's Date: 11/20/2024       Subjective   Info and History:  Surgical Information and Patient History  Date of Surgery: 11/19/24  PT Visit Date: 11/20/24  Reason for Referral: post-op gait training - LLE NWB  Surgical Procedure: Pt is s/p left ankle lateral malleolus ORIF and left ankle syndesmosis operative fixation by Dr. Bolanos and Dr. Gusman.  History Relevant to Rehab: HTN  Prior to Session Communication: Bedside nurse  Patient Position Received: Bed, 3 rail up  Precautions:  Precautions  LE Weight Bearing Status: Left Non-Weight Bearing  Medical Precautions: Fall precautions  Surgical Dressing Intact: Yes   Home Living:  Home Living  Type of Home: Apartment  Lives With: Friends, Parent(s) (Mom staying with patient temporarily)  Home Adaptive Equipment: Crutches  Home Layout: One level  Home Access:  (1 step to enter)    Objective   Cognition:  Cognition/Behavior  Arousal/Alertness: Appropriate responses to stimuli  Orientation Level: Oriented X4  Following Commands: Follows all commands and directions without difficulty  Participating in Session: Yes  Pain:  Pain Assessment  Pain Assessment: 0-10  0-10 (Numeric) Pain Score: 0 - No pain  Extremity Assessments:  RUE:   RUE   RUE : Within Functional Limits  LUE:  LUE   LUE: Within Functional Limits  RLE:  RLE   RLE : Within Functional Limits  LLE:  LLE   LLE : Exceptions to WFL, ankle ROM/strength limited due to post-op pain and surgeon restrictions.       Ambulation/Gait Training:  Ambulation/Gait Training  Ambulation/Gait Training Performed: Yes  Ambulation/Gait Training 1  Surface 1: Level tile  Device 1: Axillary crutches  Assistance 1: Close supervision  Quality of Gait 1: Decreased step length (decreased darryn, hop to pattern, good adherence to LLE NWB, no LOB noted.)  Comments/Distance (ft) 1: 50 feet x 3  Transfers:  Transfers  Transfer: Yes  Transfer 1  Transfer From 1: Bed  "to  Transfer to 1: Bed  Technique 1: Sit to stand, Stand to sit  Transfer Device 1: Crutches  Transfer Level of Assistance 1: Close supervision (from elevated EOB surface - pt is 6'8\")  Stairs:  Stairs  Stairs: No      Assessment/Plan   Assessment and Plan  PT Assessment Results: Decreased strength, Decreased range of motion, Decreased endurance, Impaired balance, Orthopedic restrictions, Pain  PT Plan: Ongoing PT  Device Issued: Crutches  Recommendations: Pt presents with impaired functional mobility s/p L ankle surgery. Recommend discharge home with intermittent assistance and outpatient PT once cleared by surgeon.   End of Session Communication: Bedside nurse  End of Session Patient Position: Bed, 2 rail up    Radha Braxton, PT, DPT  "

## 2024-11-20 NOTE — OP NOTE
*FIRST CASE, 23 HOUR* LEFT Open Reduction Internal Fixation Distal Fibula, tight rope fixation, diagnostic arthroscopy (LMA/PNB, FLUORO) (Arthrex Fibula Fx set, tightropes, Athrex nanoscope, FLUORO, 5 inch plaster splint material) (L) Operative Note     Date: 2024  OR Location: [facility] OR    Name: Horace Griffin, : 2000, Age: 24 y.o., MRN: [id number], Sex: male    Diagnosis  Pre-op Diagnosis      * Traumatic closed displaced fracture of distal fibula [S82.839A], left lateral malleolus ankle fracture, Left ankle syndesmosis disruption Post-op Diagnosis     * Traumatic closed displaced fracture of distal fibula [S82.839A] left lateral malleolus ankle fracture, Left ankle syndesmosis disruption     Procedures  Left ankle lateral malleolus ORIF  Left ankle syndesmosis operative fixation  Surgeons   Geoff Gusman MD    Resident/Fellow/Other Assistant:  MD Balta Rice PA-C: He was my primary assistant for this procedure.  There no available residents to assist with for this procedure.  His assistance was needed and critical to the success of this procedure.  He assisted with surgical exposure, placement of retractors and implantation of implant/prosthesis and wound closure      Staff:   Circulator: Margarita Monahan Person: Trang    Anesthesia Staff: Anesthesiologist: Keshawn Zurita DO  C-AA: ANDREW Gray    Procedure Summary  Anesthesia: Regional, General  ASA: II  Estimated Blood Loss: 10 mL  Intra-op Medications:   Administrations occurring from 0710 to 0915 on 24:   Medication Name Total Dose   vancomycin (Vancocin) vial for injection 1 g   tobramycin (Nebcin) injection 1,200 mg   ceFAZolin (Ancef) 3 g in sodium chloride 0.9%  mL 3 g   dexAMETHasone (Decadron) injection 4 mg/mL 8 mg   dexmedeTOMIDine 4 mcg/mL in 100 mL NS infusion 73.43 mcg   fentaNYL (Sublimaze) injection 50 mcg/mL 100 mcg   LR infusion 150 mL   lidocaine (Xylocaine) injection 2 % 100 mg   propofol  (Diprivan) injection 10 mg/mL 400 mg   tranexamic acid (Cyklokapron) 6,250 mg in sodium chloride 0.9% 312.5 mL (20 mg/mL) infusion 1,000 mg              Anesthesia Record               Intraprocedure I/O Totals          Intake    Dexmedetomidine 0.00 mL    The total shown is the total volume documented since Anesthesia Start was filed.    Tranexamic Acid 0.00 mL    The total shown is the total volume documented since Anesthesia Start was filed.    LR infusion 900.00 mL    Total Intake 900 mL          Specimen: No specimens collected              Drains and/or Catheters: * None in log *    Tourniquet Times:     Total Tourniquet Time Documented:  Thigh (Left) - 81 minutes  Total: Thigh (Left) - 81 minutes      Implants:  Implants       Type Name Action Serial No.      Implant KIT, REPAIR, TIGHTROPE XP, SYNEDESMOSIS - [id number] Implanted      Implant KIT, REPAIR, TIGHTROPE XP, SYNEDESMOSIS - [id number] Implanted      Screw PLATE, LOCKING, 10H, THIRD TUBULAR, SS - SN/A - [id number] Implanted N/A     Screw SCREW, LOW PROFILE, LOCKING, 2.7MM X 14MM, SS - SN/A - [id number] Implanted N/A     Screw SCREW, LOW PROFILE, CORTICAL, 3.5 X 16M, SS - SN/A - [id number] Implanted N/A     Screw SCREW, LOW PROFILE, CORTICAL, 3.5 X 18MM, SS - SN/A - [id number] Wasted N/A     Screw SCREW, LOW PROFILE, LOCKING, 3.5 X 22MM, SS - SN/A - [id number] Implanted N/A     Screw SCREW, LOW PROFILE, LOCKING, 3.5 X 20MM, SS - SN/A - [id number] Implanted N/A     Screw SCREW, LOW PROFILE, CORTICAL, 3.5 X 14MM, SS - SN/A - [id number] Implanted N/A      NANONEEDLE HIGH FLOW OPERATIVE KIT 125MM Implanted               Findings: see below    Indications: Horace Griffin is an 24 y.o. male who is having surgery for Traumatic closed displaced fracture of distal fibula [S82.159V].  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  Patient was splinted and returned back to [facility] for surgical  treatment for his unstable ankle injury.  I was consulted by Dr. Nickolas Bolanos the attending physician.  We discussed his case and reviewed his imaging report I agree that this is a very unstable ankle injury best managed with surgical treatment.  This was discussed with patient and his mother.    The patient was seen in the preoperative area. The risks, benefits, complications, treatment options, non-operative alternatives, expected recovery and outcomes were discussed with the patient.  We also discussed risk of posttraumatic arthritis with an ankle injury like this.  We also discussed postoperative ankle stiffness and chronic pain and hardware irritation and need for hardware removal.  The possibilities of reaction to medication, pulmonary aspiration, injury to surrounding structures, bleeding, recurrent infection, the need for additional procedures, failure to diagnose a condition, and creating a complication requiring transfusion or operation were discussed with the patient. The patient concurred with the proposed plan, giving informed consent.  The site of surgery was properly noted/marked if necessary per policy. The patient has been actively warmed in preoperative area. Preoperative antibiotics have been ordered and given within 1 hours of incision. Venous thrombosis prophylaxis have been ordered including bilateral sequential compression devices and chemical prophylaxis    Procedure Details:   Patient received preoperative peripheral nerve block.  We proceeded to the operating room.  Appropriate timeout was performed.  Patient was transferred to operating room table placed in supine position with all bony prominences adequately padded.  General anesthesia with LMA was performed by the anesthesia team.  The leg was prepped and draped in usual sterile fashion.  A well-padded tourniquet was placed prior to this.  The leg was exsanguinated and tourniquet was inflated.  I marked out the incision over the fibula.   The incision was slightly posterior lateral.  I used a knife to perform the incision.  We dissected down sharply.  We were careful to look for the superficial peroneal nerve.  However due to the posterolateral approach the nerve was not encountered.  The anterior edge fascia of the peroneal was incised.  Breakdown of the fracture site.  Patient had a long oblique fracture.  We gapped the fracture open and there was comminuted bony fragment within the fracture site.  This was removed as they were devitalized.  We copiously irrigated the fracture site.  2 pointed reduction forceps was used to reduce the fracture anatomically.  2 lag screws were used,  these were 2 2.7 mm screws using standard AO technique.  We had excellent reduction and fixation of the fracture site.  Neutralization plate was applied.  We contoured the plate to Plavix anatomy.  Cortical screws was used to compress the plate down to bone.  Should be noted that we lysed her fracture outpatient through the plate using the 3.5 millimeter screw.  Distally we used 2 locking screws enough for the screw head to recessed within the plate given the subcutaneous location of the hardware.  Once.  There are fracture fixation it was apparent that the patient had increased interval of the tibia-fibula space interval and widening of the medial clear space.  At this point is not surprising given that the fracture pattern is a high fibular fracture with anticipated syndesmosis instability and disruption.  I then reduced the syndesmosis using para-articular clamp.  We exposed the anterior aspect of the fibula and noted that there was congruent reduction of the syndesmosis looking at the anterior portion.  Tooth tightrope was then passed and tightened to maintain the syndesmosis reduction.  We confirmed that the reduction was anatomic.  We also confirmed using fluoroscopy using the perfect dome lateral technique.  At this point we copiously irrigated the wound.  We  added vancomycin and tobramycin prior to the wound.  The fascia of the peroneals was closed.  We closed the wound in layered fashion.   Dr Bolanos then proceeded with the arthroscopic portion of the procedure.  Please refer to his note for detailed documentation.  Once he completed procedure the laparoscopic portion of the procedure and closed all the incisions.  Sterile dressing was applied.  Patient was then placed in well-padded short leg splint.  Complications:  None; patient tolerated the procedure well.    Disposition: PACU - hemodynamically stable.  Condition: stable             Task Performed by JITENDRA First Assist or Physician Assistant:   Balta GILES/NP, was necessary to assist on this case due to the nature of the case and difficulty. During the case Macie  served as my assist with surgical exposure, placement of retractors and implantation of implant/prosthesis and wound closure  ..    Additional Details:       Attending Attestation: I was present and scrubbed for the entire procedure.

## 2024-11-20 NOTE — NURSING NOTE
Resting in bed with call light within reach.  Voices adequate pain control at this time.  LLE elevated on pillows.  Blood pressure 173/94, below parameters for PRN blood pressure medication.  Voices no concerns at this time.

## 2024-11-20 NOTE — NURSING NOTE
Patient was awake in bed, administered his morning medications including Lisinopril. Will recheck BP to see if SBP has decreased.

## 2024-11-20 NOTE — CARE PLAN
The patient's goals for the shift include safety     The clinical goals for the shift include pain control

## 2024-11-20 NOTE — CARE PLAN
The patient's goals for the shift include      The clinical goals for the shift include pain control move better

## 2024-11-20 NOTE — NURSING NOTE
Patient was educated on the need to take PRN Hydrazaline to help decrease his BP, was encouraged by hospitalist and fellow for Ortho.  Patient declines the medication intervention at this time.  Discharge order complete, medications were sent to  [facility].  Will be ready for patient to  at 10a.  After patient sees therapy he will be discharged home.

## 2024-11-20 NOTE — DISCHARGE SUMMARY
Discharge Diagnosis  Traumatic closed displaced fracture of distal fibula    Issues Requiring Follow-Up    Test Results Pending At Discharge  Pending Labs       No current pending labs.            Hospital Course     Pertinent Physical Exam At Time of Discharge  Physical Exam    Home Medications     Medication List      START taking these medications     docusate sodium 100 mg capsule; Commonly known as: Colace; Take 1   capsule (100 mg) by mouth 2 times a day for 15 days.   Eliquis 5 mg tablet; Generic drug: apixaban; Take 1 tablet (5 mg) by   mouth 2 times a day. Do not start before November 23, 2024 after   completing enoxaparin injections.; Start taking on: November 23, 2024   enoxaparin 40 mg/0.4 mL syringe; Commonly known as: Lovenox; Inject 0.4   mL (40 mg) under the skin 2 times a day.   ondansetron 4 mg tablet; Commonly known as: Zofran; Take 1 tablet (4 mg)   by mouth every 8 hours if needed for nausea or vomiting.   oxyCODONE-acetaminophen 5-325 mg tablet; Commonly known as: Percocet;   Take 1 tablet by mouth every 6 hours if needed for severe pain (7 - 10).     CONTINUE taking these medications     benzoyl peroxide 10 % external wash; Commonly known as: Benzac AC; APPLY   TO AFFECTED AREA TWICE A DAY   lisinopril 40 mg tablet; Take 1 tablet (40 mg) by mouth once daily.   ondansetron ODT 8 mg disintegrating tablet; Commonly known as:   Zofran-ODT; Take 1 tablet (8 mg) by mouth every 8 hours if needed for   nausea or vomiting.   tirzepatide 15 mg/0.5 mL pen injector; Inject 15 mg under the skin every   7 days.     STOP taking these medications     celecoxib 200 mg capsule; Commonly known as: CeleBREX   desmopressin 0.2 mg tablet; Commonly known as: DDAVP       Outpatient Follow-Up  No future appointments.    Balta Quijano PA-C

## 2024-11-20 NOTE — ASSESSMENT & PLAN NOTE
POD1 s/p left ankle arthroscopy and ORIF with Sonny Bolanos and Uzma.     Hypertension- continue lisinopril. PRN hydralazine as needed per medicine.

## 2024-11-25 NOTE — OP NOTE
LEFT Open Reduction Internal Fixation Distal Fibula, Tightrope fixation, diagnostic arthroscopy Operative Note     Date: 2024  OR Location: [facility] OR    Name: Horace Griffin YOB: 2000, Age: 24 y.o., MRN: [id number], Sex: male    Diagnosis  Pre-op Diagnosis      * Traumatic closed displaced fracture of distal fibula [S82.839A] Post-op Diagnosis     * Traumatic closed displaced fracture of distal fibula [S82.839A]     * Syndessmosis disruption     * Synovitis, articular cartilage fraying     Procedures  Left distal fibula open reduction internal fixation  Left open syndesmosis stabilization with Tightrope devices  Left ankle arthroscopic debridement of synovium and articular cartilage  Left ankle diagnostic arthroscopy and examination under fluoroscopy    Surgeons      * Nickolas Bolanos - Primary    Resident/Fellow/Other Assistant:    * Geoff Gusman MD - Co Surgeon    * TISHA Garcia - Assistant    Staff:   Circulator: Margarita Monahan Person: Trang    Anesthesia Staff: Anesthesiologist: Keshawn Zurita DO  C-AA: ANDREW Gray    Procedure Summary  Anesthesia: Regional, General  ASA: II  Estimated Blood Loss: 15 mL  Intra-op Medications:   Administrations occurring from 0710 to 0915 on 24:   Medication Name Total Dose   vancomycin (Vancocin) vial for injection 1 g   tobramycin (Nebcin) injection 1,200 mg   ceFAZolin (Ancef) 3 g in sodium chloride 0.9%  mL 3 g   dexAMETHasone (Decadron) injection 4 mg/mL 8 mg   dexmedeTOMIDine 4 mcg/mL in 100 mL NS infusion 73.43 mcg   fentaNYL (Sublimaze) injection 50 mcg/mL 100 mcg   LR infusion 150 mL   lidocaine (Xylocaine) injection 2 % 100 mg   propofol (Diprivan) injection 10 mg/mL 400 mg   tranexamic acid (Cyklokapron) 6,250 mg in sodium chloride 0.9% 312.5 mL (20 mg/mL) infusion 1,000 mg          Anesthesia Record               Intraprocedure I/O Totals          Intake    Dexmedetomidine 0.00 mL    The total shown is the total volume documented  since Anesthesia Start was filed.    Tranexamic Acid 0.00 mL    The total shown is the total volume documented since Anesthesia Start was filed.    LR infusion 900.00 mL    Total Intake 900 mL          Specimen: No specimens collected      Drains and/or Catheters: None    Tourniquet Times:     Total Tourniquet Time Documented:  Thigh (Left) - 81 minutes  Total: Thigh (Left) - 81 minutes      Implants:  Implants       Type Name Action Serial No.      Implant KIT, REPAIR, TIGHTROPE XP, SYNEDESMOSIS - [id number] Implanted      Implant KIT, REPAIR, TIGHTROPE XP, SYNEDESMOSIS - [id number] Implanted      Screw PLATE, LOCKING, 10H, THIRD TUBULAR, SS - SN/A - [id number] Implanted N/A     Screw SCREW, LOW PROFILE, LOCKING, 2.7MM X 14MM, SS - SN/A - [id number] Implanted N/A     Screw SCREW, LOW PROFILE, CORTICAL, 3.5 X 16M, SS - SN/A - [id number] Implanted N/A     Screw SCREW, LOW PROFILE, CORTICAL, 3.5 X 18MM, SS - SN/A - [id number] Wasted N/A     Screw SCREW, LOW PROFILE, LOCKING, 3.5 X 22MM, SS - SN/A - [id number] Implanted N/A     Screw SCREW, LOW PROFILE, LOCKING, 3.5 X 20MM, SS - SN/A - [id number] Implanted N/A     Screw SCREW, LOW PROFILE, CORTICAL, 3.5 X 14MM, SS - SN/A - [id number] Implanted N/A      NANONEEDLE HIGH FLOW OPERATIVE KIT 125MM Used, Not Implanted             Findings: Moreno C distal fibula fracture, syndesmosis disruption, ankle synovitis and articular cartilage scuffing    Indications: Horace Griffin is an 24 y.o. male who is having surgery for Traumatic closed displaced fracture of distal fibula [S82.554M].  The injury occurred during a game from a traumatic cause.  Physical examination, radiographs, and MRI reveal a Moreno C distal fibula fracture, syndesmosis disruption and potential articular cartilage injury.  Surgery is indicated.  Risks and benefits were discussed with the patient and his mother at length.  After questions were answered I personally obtained consent.  We discussed my trauma  colleague, Dr. Gusman assisting with the case.  In the preoperative area his splint was removed and skin intact.    The patient was seen in the preoperative area. The risks, benefits, complications, treatment options, non-operative alternatives, expected recovery and outcomes were discussed with the patient. The possibilities of reaction to medication, pulmonary aspiration, injury to surrounding structures, bleeding, recurrent infection, the need for additional procedures, failure to diagnose a condition, and creating a complication requiring transfusion or operation were discussed with the patient. The patient concurred with the proposed plan, giving informed consent.  The site of surgery was properly noted/marked if necessary per policy. The patient has been actively warmed in preoperative area. Preoperative antibiotics have been ordered and given within 1 hours of incision. Venous thrombosis prophylaxis have been ordered including unilateral sequential compression device    Procedure Details: In the holding area a peripheral nerve block was performed under ultrasound guidance by the attending anesthesiologist.  In the operative suite the patient was placed supine on the table.  An LMA was inserted by the anesthesiologist.  A left thigh tourniquet was placed.  The left lower extremity was prepped and draped in usual sterile fashion.  A timeout was performed and 3 g of Ancef were given prior to initiating the procedure.  Left ankle diagnostic arthroscopy was performed using an Arthrex Nanoscope technique.  A nick and stab technique was utilized to carefully create the portals.  The ankle was lavaged of hemarthrosis.    Diagnostic arthroscopy was performed.  The central and lateral portions of the talar articular cartilage were intact.  There was a small amount of articular cartilage scuffing over the medial talus and far anterior portion of the distal tibia.  Arthroscopic chondroplasty and debridement was  carefully performed back to stable articular cartilage.  No evidence of full-thickness loss.  There was notable synovitis within the gutters and anterior compartment which was debrided with the shaver and arthroscopic electrocautery.  The syndesmosis was assessed with instability and unstable distal fragments were carefully debrided.  The deltoid was assessed with mild fraying that was also carefully debrided.  There was overall integrity of the medial ligament.  Arthroscopic images were taken throughout the procedure.  Instruments were then removed and fluid was expelled.  The portals were closed with nylon suture.  A lateral incision was then created at the level of the distal fibula.  Careful dissection was performed and superficial bleeders were cauterized.  The superficial peroneal nerve was protected throughout the case.  Careful subperiosteal dissection revealed the comminuted Moreno C distal fibula fracture.  It was carefully debrided and irrigated.  Open reduction of the distal fibula fracture was performed with gentle traction and reduction clamp techniques.  Anatomic reduction was obtained.  A total of two 2.7 mm lag screws were placed perpendicular to the fracture site to stabilize the fracture site.  The clamps were removed and there was stable anatomic reduction.  An Arthrex distal fibula locking plate was then applied and screws were placed both proximal and distal to complete the open reduction and internal fixation.  AP, mortise and lateral views were taken to confirm hardware placement and fracture fixation as well as direct visualization.  This completed open reduction and internal fixation of the distal fibula fracture.  Open syndesmosis fixation was then performed with a total of 2 Arthrex tight rope devices placed distal to the fracture into open holes of the plate.  These were placed at diverging angles through all 4 cortices.  The medial buttons were then passed across and flipped.  The  Arthrex tight rope buttons were then tensioned with a clamp placed across the syndesmosis holding provisional fixation.  With the buttons tensioned the clamp was released and there was excellent stability of the syndesmosis.  The syndesmosis was stressed with direct visualization as well as fluoroscopic visualization revealing a stable syndesmosis and no evidence of medial clear space or deltoid widening.  The ankle was then lavaged and suctioned of debris.  Antibiotic powder was placed.  The periosteum was then closed over the top followed by a layered closure in the skin.  Running horizontal mattress nylon sutures were then placed.  A Xeroform and sterile gauze dressing was applied followed by well-padded plaster splint with the ankle held in neutral position followed by an Ace wrap.  All sponge counts and needle counts were correct.  Geoff Gusman MD, a fellowship trained orthopedic trauma surgeon, was present for the entire case.  He served as a co-surgeon participating in open reduction internal fixation of the distal fibula and syndesmosis fixation.  TISHA Garcia was present for the case.  He provided assistance with patient positioning, holding of retractors, and splint application.    Complications:  None; patient tolerated the procedure well.    Disposition: PACU - hemodynamically stable.  Condition: stable   Additional Details: DVT prophylaxis includes Lovenox, compression stockings, and sequential compression devices  Attending Attestation: I was present and scrubbed for the entire procedure.    Nickolas Bolanos  Phone Number: [phone]

## 2024-11-25 NOTE — BRIEF OP NOTE
Date: 2024  OR Location: [facility] OR    Name: Horace Griffin YOB: 2000, Age: 24 y.o., MRN: [id number], Sex: male    Diagnosis  Pre-op Diagnosis      * Traumatic closed displaced fracture of distal fibula [S82.839A] Post-op Diagnosis     * Traumatic closed displaced fracture of distal fibula [S82.839A]     * Syndessmosis disruption     * Synovitis, articular cartilage fraying     Procedures  Left distal fibula open reduction internal fixation  Left open syndesmosis stabilization with Tightrope devices  Left ankle arthroscopic debridement of synovium and articular cartilage  Left ankle diagnostic arthroscopy and examination under fluoroscopy    Surgeons      * Nickolas Bolanos - Primary    Resident/Fellow/Other Assistant:    * Geoff Gusman MD - Co Surgeon    * TISHA Garcia - Assistant    Staff:   Circulator: Margarita Monahan Person: Trang    Anesthesia Staff: Anesthesiologist: Keshawn Zurita DO  C-AA: ANDREW Gray    Procedure Summary  Anesthesia: Regional, General  ASA: II  Estimated Blood Loss: 10 mL  Intra-op Medications:   Administrations occurring from 0710 to 0915 on 24:   Medication Name Total Dose   vancomycin (Vancocin) vial for injection 1 g   tobramycin (Nebcin) injection 1,200 mg   ceFAZolin (Ancef) 3 g in sodium chloride 0.9%  mL 3 g   dexAMETHasone (Decadron) injection 4 mg/mL 8 mg   dexmedeTOMIDine 4 mcg/mL in 100 mL NS infusion 73.43 mcg   fentaNYL (Sublimaze) injection 50 mcg/mL 100 mcg   LR infusion 150 mL   lidocaine (Xylocaine) injection 2 % 100 mg   propofol (Diprivan) injection 10 mg/mL 400 mg   tranexamic acid (Cyklokapron) 6,250 mg in sodium chloride 0.9% 312.5 mL (20 mg/mL) infusion 1,000 mg            Anesthesia Record               Intraprocedure I/O Totals          Intake    Dexmedetomidine 0.00 mL    The total shown is the total volume documented since Anesthesia Start was filed.    Tranexamic Acid 0.00 mL    The total shown is the total volume documented  since Anesthesia Start was filed.    LR infusion 900.00 mL    Total Intake 900 mL          Specimen: No specimens collected     Findings: Closed Moreno C distal fibula fracture, syndesmosis disruption, synovitis and articular cartilage scuffing    Complications:  None; patient tolerated the procedure well.     Disposition: PACU - hemodynamically stable.  Condition: stable  Specimens Collected: No specimens collected  Attending Attestation: I was present and scrubbed for the entire procedure.    Nickolas Bolanos  Phone Number: [phone]

## 2024-11-26 ENCOUNTER — OFFICE VISIT (OUTPATIENT)
Dept: ORTHOPEDIC SURGERY | Facility: HOSPITAL | Age: 24
End: 2024-11-26
Payer: COMMERCIAL

## 2024-11-26 DIAGNOSIS — S82.891D CLOSED RIGHT ANKLE FRACTURE, WITH ROUTINE HEALING, SUBSEQUENT ENCOUNTER: Primary | ICD-10-CM

## 2024-11-26 PROCEDURE — 99211 OFF/OP EST MAY X REQ PHY/QHP: CPT | Performed by: ORTHOPAEDIC SURGERY

## 2024-11-26 NOTE — PROGRESS NOTES
Mr. Griffin presented today for a splint change that was planned.  He is having minimal pain and occasionally takes a pain medication at night.  We clarified his anticoagulation and he has been taking Eliquis since he completed the Lovenox.  He does not need to take aspirin therefore.  His splint was removed today.  His incisions are healing nicely.  He had a very fracture small blister medially that was cleansed and dressed with Xeroform.  He was then placed in a well-padded plaster splint in neutral position and an Ace wrap applied.  Continue with nonweightbearing and elevation.  I will see him again next week.

## 2024-12-04 DIAGNOSIS — S82.891D CLOSED RIGHT ANKLE FRACTURE, WITH ROUTINE HEALING, SUBSEQUENT ENCOUNTER: ICD-10-CM

## 2024-12-16 ENCOUNTER — HOSPITAL ENCOUNTER (OUTPATIENT)
Dept: RADIOLOGY | Facility: CLINIC | Age: 24
Discharge: HOME | End: 2024-12-16
Payer: COMMERCIAL

## 2024-12-16 DIAGNOSIS — S82.832D CLOSED AVULSION FRACTURE OF DISTAL END OF LEFT FIBULA WITH ROUTINE HEALING: Primary | ICD-10-CM

## 2024-12-16 DIAGNOSIS — S82.832D CLOSED AVULSION FRACTURE OF DISTAL END OF LEFT FIBULA WITH ROUTINE HEALING: ICD-10-CM

## 2024-12-16 PROCEDURE — 73610 X-RAY EXAM OF ANKLE: CPT | Mod: LEFT SIDE | Performed by: RADIOLOGY

## 2024-12-16 PROCEDURE — 73610 X-RAY EXAM OF ANKLE: CPT | Mod: LT,398

## 2024-12-30 ENCOUNTER — HOSPITAL ENCOUNTER (OUTPATIENT)
Dept: RADIOLOGY | Facility: CLINIC | Age: 24
Discharge: HOME | End: 2024-12-30
Payer: COMMERCIAL

## 2024-12-30 DIAGNOSIS — S82.892S CLOSED LEFT ANKLE FRACTURE, SEQUELA: ICD-10-CM

## 2024-12-30 DIAGNOSIS — S82.891D CLOSED RIGHT ANKLE FRACTURE, WITH ROUTINE HEALING, SUBSEQUENT ENCOUNTER: Primary | ICD-10-CM

## 2024-12-30 PROCEDURE — 73610 X-RAY EXAM OF ANKLE: CPT | Mod: LT,398

## 2025-01-13 DIAGNOSIS — E66.01 CLASS 3 SEVERE OBESITY WITHOUT SERIOUS COMORBIDITY WITH BODY MASS INDEX (BMI) OF 40.0 TO 44.9 IN ADULT, UNSPECIFIED OBESITY TYPE: ICD-10-CM

## 2025-01-13 DIAGNOSIS — E66.813 CLASS 3 SEVERE OBESITY WITHOUT SERIOUS COMORBIDITY WITH BODY MASS INDEX (BMI) OF 40.0 TO 44.9 IN ADULT, UNSPECIFIED OBESITY TYPE: ICD-10-CM

## 2025-01-13 DIAGNOSIS — G47.33 OSA (OBSTRUCTIVE SLEEP APNEA): ICD-10-CM

## 2025-01-14 ENCOUNTER — HOSPITAL ENCOUNTER (OUTPATIENT)
Dept: RADIOLOGY | Facility: HOSPITAL | Age: 25
Discharge: HOME | End: 2025-01-14
Payer: COMMERCIAL

## 2025-01-14 ENCOUNTER — OFFICE VISIT (OUTPATIENT)
Dept: ORTHOPEDIC SURGERY | Facility: HOSPITAL | Age: 25
End: 2025-01-14
Payer: COMMERCIAL

## 2025-01-14 DIAGNOSIS — S82.832D CLOSED AVULSION FRACTURE OF DISTAL END OF LEFT FIBULA WITH ROUTINE HEALING: ICD-10-CM

## 2025-01-14 PROCEDURE — 99211 OFF/OP EST MAY X REQ PHY/QHP: CPT | Performed by: ORTHOPAEDIC SURGERY

## 2025-01-14 PROCEDURE — 73610 X-RAY EXAM OF ANKLE: CPT | Mod: LT,398

## 2025-01-17 NOTE — PROGRESS NOTES
Mr. Griffin presented today for an x-ray and follow-up evaluation of his right ankle surgery.  He is doing quite well.  He is now 8 weeks out from surgery.  His pain has decreased significantly.  He is ambulating well with a cam walking boot.  The tentative plan is to get him out of the boot next week.  He will continue working with our athletic training staff on his recovery protocol.  He has a few areas where the Vicryl sutures have split along the lateral incision, however this continues to heal quite well without any concerns.  His ankle range of motion is improving and his swelling is coming down.  We discussed the importance of continuing to follow this protocol.  His x-rays demonstrate a healing fracture with a stable syndesmosis and intact ankle mortise without any evidence of hardware complication.  All his questions were answered and he is in agreement with this plan.   Patient sleeping with no visible signs or symptoms of distress, appears comfortable.  Equal chest rise, breathing non labored. RN to continue to monitor. Sitter posted.

## 2025-02-07 ENCOUNTER — OFFICE VISIT (OUTPATIENT)
Dept: ORTHOPEDIC SURGERY | Facility: HOSPITAL | Age: 25
End: 2025-02-07
Payer: COMMERCIAL

## 2025-02-07 DIAGNOSIS — S82.832D CLOSED AVULSION FRACTURE OF DISTAL END OF LEFT FIBULA WITH ROUTINE HEALING: Primary | ICD-10-CM

## 2025-02-07 PROCEDURE — 99213 OFFICE O/P EST LOW 20 MIN: CPT | Performed by: ORTHOPAEDIC SURGERY

## 2025-02-07 RX ORDER — ONDANSETRON 4 MG/1
TABLET, ORALLY DISINTEGRATING ORAL
COMMUNITY
Start: 2024-10-02

## 2025-02-07 RX ORDER — FAMCICLOVIR 500 MG/1
TABLET ORAL
COMMUNITY
Start: 2024-12-23

## 2025-02-07 RX ORDER — TIRZEPATIDE 12.5 MG/.5ML
INJECTION, SOLUTION SUBCUTANEOUS
COMMUNITY
Start: 2024-10-27

## 2025-02-12 NOTE — PROGRESS NOTES
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  He is couple weeks out from his knee scope his incisions are healing well he is no evidence of lower extremity DVT.  He may progress as per his treating surgeons recommendations in that regard we performed a postop evaluation at their request.  We will defer to their treatment protocol for further management of the knee.    In regard to the ankle things are healing well his incisions are well-healed he has some mild soft tissue swelling that is improving.  He will continue per protocol of his treating surgeon with the athletic training staff at the [facility].    He is in agreement with the plan of care

## 2025-02-19 DIAGNOSIS — R11.0 NAUSEA: Primary | ICD-10-CM

## 2025-02-19 DIAGNOSIS — S82.832D CLOSED AVULSION FRACTURE OF DISTAL END OF LEFT FIBULA WITH ROUTINE HEALING: ICD-10-CM

## 2025-02-19 RX ORDER — CELECOXIB 200 MG/1
200 CAPSULE ORAL DAILY
Qty: 14 CAPSULE | Refills: 0 | Status: SHIPPED | OUTPATIENT
Start: 2025-02-19 | End: 2025-03-05

## 2025-02-19 RX ORDER — ONDANSETRON 4 MG/1
4 TABLET, ORALLY DISINTEGRATING ORAL EVERY 8 HOURS PRN
Qty: 20 TABLET | Refills: 0 | Status: SHIPPED | OUTPATIENT
Start: 2025-02-19 | End: 2025-02-26

## 2025-02-19 NOTE — PROGRESS NOTES
Player is requesting a refill of his Zofran medication. He does typically have nausea with the Mounjaro injections and is out of Zofran. I did send a refill of this to his pharmacy.

## 2025-03-06 DIAGNOSIS — G89.29 CHRONIC RIGHT SHOULDER PAIN: ICD-10-CM

## 2025-03-06 DIAGNOSIS — M25.511 CHRONIC RIGHT SHOULDER PAIN: ICD-10-CM

## 2025-03-10 ENCOUNTER — HOSPITAL ENCOUNTER (OUTPATIENT)
Dept: RADIOLOGY | Facility: HOSPITAL | Age: 25
Discharge: HOME | End: 2025-03-10
Payer: COMMERCIAL

## 2025-03-10 DIAGNOSIS — Z02.5 SPORTS PHYSICAL: ICD-10-CM

## 2025-03-10 DIAGNOSIS — G89.29 CHRONIC RIGHT SHOULDER PAIN: ICD-10-CM

## 2025-03-10 DIAGNOSIS — M25.511 CHRONIC RIGHT SHOULDER PAIN: ICD-10-CM

## 2025-03-11 ENCOUNTER — DOCUMENTATION (OUTPATIENT)
Dept: OPERATING ROOM | Facility: HOSPITAL | Age: 25
End: 2025-03-11
Payer: COMMERCIAL

## 2025-03-11 ENCOUNTER — HOSPITAL ENCOUNTER (OUTPATIENT)
Dept: RADIOLOGY | Facility: CLINIC | Age: 25
Discharge: HOME | End: 2025-03-11
Payer: COMMERCIAL

## 2025-03-11 DIAGNOSIS — M25.311 SHOULDER INSTABILITY, RIGHT: Primary | ICD-10-CM

## 2025-03-11 PROCEDURE — 73221 MRI JOINT UPR EXTREM W/O DYE: CPT | Mod: RT,398

## 2025-03-11 NOTE — PROGRESS NOTES
I evaluated Mr. Griffin at the [facility] on Thursday, March 6.  He is recovering nicely status post left ankle fracture fixation performed by me and right knee arthroscopy performed by Dr. Gutierrez.  He is progressing appropriately with both of these.  There is just a little bit of soreness but his lower body strengthening is advancing nicely.  Today we specifically reviewed his right shoulder.  He has a history of shoulder instability on the side.  He played this past season utilizing a harness.  He would like to avoid surgery to allow for further training and preparation for the upcoming season due to his recent to lower extremity surgeries.  He has not had any recurrent instability episodes.  He just notes some soreness and occasional workout modifications.  On examination today he has full and symmetric range of motion in both shoulders.  I cannot recreate any apprehension.  He has a little bit of discomfort with load shift maneuver in both shoulders.  He has full rotator cuff strength in all planes.  Intact neurovascular exam.  Again he would like to try and continue with conservative treatment for his right shoulders.  He is not having any deandre instability per his report.  Again he was able to play through the season with both shoulders.  I think it is reasonable to obtain a new MRI of his right shoulder.  As long as there is no significant change in his overall status and structure, continuing with conservative treatment is reasonable.  He understands the options of conservative care versus surgical stabilization.  I will continue to monitor his progress for both his shoulder and his lower extremity.

## 2025-03-20 ENCOUNTER — APPOINTMENT (OUTPATIENT)
Dept: ORTHOPEDIC SURGERY | Facility: CLINIC | Age: 25
End: 2025-03-20
Payer: COMMERCIAL

## 2025-03-24 ENCOUNTER — OFFICE VISIT (OUTPATIENT)
Dept: ORTHOPEDIC SURGERY | Facility: HOSPITAL | Age: 25
End: 2025-03-24
Payer: COMMERCIAL

## 2025-03-24 DIAGNOSIS — M25.511 ACUTE PAIN OF RIGHT SHOULDER: Primary | ICD-10-CM

## 2025-03-24 PROCEDURE — 99213 OFFICE O/P EST LOW 20 MIN: CPT | Performed by: ORTHOPAEDIC SURGERY

## 2025-03-24 NOTE — PROGRESS NOTES
Mr. Griffin presents for a planned right shoulder PRP injection.  He has known right shoulder posterior labral tear.  He also has some trap soreness.  We have previously discussed a PRP injection to assist with inflammation.  I reviewed risks and benefits with him.  After questions were answered he provided consent to proceed.    A right glenohumeral intra-articular injection was performed for a diagnosis of right shoulder posterior labral tear.  Under sterile conditions the phlebotomist assisted with withdrawing 15 cc of blood from the left dorsal hand.  The blood was then spun down in the [employer] centrifuge and 6 cc of PRP was available for injection.  Under sterile conditions the right glenohumeral joint was injected with 4 cc of PRP without incident and a dressing was applied.  A spinal needle was utilized.  The area of trapezius muscle spasm was then identified and 2 cc of lidocaine was injected with a 22-gauge needle followed by the final 2 cc of PRP at the patient's request.  This portion of the injection was performed without incident as well and performed under sterile condition.  Postinjection instructions were provided.

## 2025-03-28 DIAGNOSIS — M25.461 EFFUSION, RIGHT KNEE: Primary | ICD-10-CM

## 2025-04-07 ENCOUNTER — DOCUMENTATION (OUTPATIENT)
Dept: ORTHOPEDIC SURGERY | Facility: CLINIC | Age: 25
End: 2025-04-07
Payer: COMMERCIAL

## 2025-04-07 DIAGNOSIS — S82.832D CLOSED AVULSION FRACTURE OF DISTAL END OF LEFT FIBULA WITH ROUTINE HEALING: Primary | ICD-10-CM

## 2025-04-07 NOTE — PROGRESS NOTES
I evaluated Horace in the training room today for follow-up regarding his left ankle and right shoulder.  He is now running routinely up to 4 days a week.  He continues with supervised rehabilitation with the team athletic training staff who was present at his visit today.  He has just a little bit of tightness in his ankle but otherwise is not having any pain.  His strength and balance are returning nicely.  He is working with the team athletic training staff on appropriate orthotic fitting and utilization due to his flatfoot.  Regarding his right shoulder he is feeling a bit better after prior injection.  He is performing a consistent rehabilitation program.  His trapezius tightness has improved.  No recurrent instability.  He continues to progress with his strength.  I cannot invoke any instability on him today.  He will continue with his supervised rehabilitation as prescribed.  I will continue to monitor his progress.

## 2025-04-30 ENCOUNTER — APPOINTMENT (OUTPATIENT)
Dept: OPHTHALMOLOGY | Facility: CLINIC | Age: 25
End: 2025-04-30
Payer: COMMERCIAL

## 2025-05-05 DIAGNOSIS — R11.0 NAUSEA: Primary | ICD-10-CM

## 2025-05-05 DIAGNOSIS — S82.62XA CLOSED DISPLACED FRACTURE OF LATERAL MALLEOLUS OF LEFT FIBULA, INITIAL ENCOUNTER: ICD-10-CM

## 2025-05-05 RX ORDER — ONDANSETRON 4 MG/1
4 TABLET, ORALLY DISINTEGRATING ORAL EVERY 8 HOURS PRN
Qty: 21 TABLET | Refills: 2 | Status: SHIPPED | OUTPATIENT
Start: 2025-05-05 | End: 2025-05-26

## 2025-05-05 NOTE — PROGRESS NOTES
Player is requesting refill of Zofran ODT to take as needed for nausea that he develops with the Mounjaro injections. I have sent this to the pharmacy for him.

## 2025-06-04 ENCOUNTER — HOSPITAL ENCOUNTER (OUTPATIENT)
Dept: RADIOLOGY | Facility: CLINIC | Age: 25
Discharge: HOME | End: 2025-06-04
Payer: COMMERCIAL

## 2025-06-04 DIAGNOSIS — M25.572 ACUTE LEFT ANKLE PAIN: ICD-10-CM

## 2025-06-04 DIAGNOSIS — M25.572 ACUTE LEFT ANKLE PAIN: Primary | ICD-10-CM

## 2025-06-04 PROCEDURE — 73610 X-RAY EXAM OF ANKLE: CPT | Mod: LT,398

## 2025-06-04 RX ORDER — CELECOXIB 200 MG/1
200 CAPSULE ORAL 2 TIMES DAILY
Qty: 28 CAPSULE | Refills: 0 | Status: SHIPPED | OUTPATIENT
Start: 2025-06-04 | End: 2025-06-18

## 2025-06-06 DIAGNOSIS — G47.33 OSA (OBSTRUCTIVE SLEEP APNEA): ICD-10-CM

## 2025-06-06 DIAGNOSIS — E66.813 CLASS 3 SEVERE OBESITY WITHOUT SERIOUS COMORBIDITY WITH BODY MASS INDEX (BMI) OF 40.0 TO 44.9 IN ADULT, UNSPECIFIED OBESITY TYPE: ICD-10-CM

## 2025-06-08 DIAGNOSIS — G47.33 OSA (OBSTRUCTIVE SLEEP APNEA): ICD-10-CM

## 2025-06-08 DIAGNOSIS — E66.813 CLASS 3 SEVERE OBESITY WITHOUT SERIOUS COMORBIDITY WITH BODY MASS INDEX (BMI) OF 40.0 TO 44.9 IN ADULT, UNSPECIFIED OBESITY TYPE: ICD-10-CM

## 2025-06-08 RX ORDER — TIRZEPATIDE 15 MG/.5ML
INJECTION, SOLUTION SUBCUTANEOUS
Qty: 2 ML | Refills: 3 | Status: SHIPPED | OUTPATIENT
Start: 2025-06-08 | End: 2025-06-08 | Stop reason: SDUPTHER

## 2025-06-08 RX ORDER — TIRZEPATIDE 15 MG/.5ML
0.5 INJECTION, SOLUTION SUBCUTANEOUS WEEKLY
Qty: 2 ML | Refills: 6 | Status: SHIPPED | OUTPATIENT
Start: 2025-06-08 | End: 2026-06-08

## 2025-06-09 ENCOUNTER — LAB (OUTPATIENT)
Dept: LAB | Facility: LAB | Age: 25
End: 2025-06-09
Payer: COMMERCIAL

## 2025-06-09 ENCOUNTER — HOSPITAL ENCOUNTER (OUTPATIENT)
Dept: RADIOLOGY | Facility: CLINIC | Age: 25
Discharge: HOME | End: 2025-06-09
Payer: COMMERCIAL

## 2025-06-09 ENCOUNTER — APPOINTMENT (OUTPATIENT)
Dept: ORTHOPEDIC SURGERY | Facility: CLINIC | Age: 25
End: 2025-06-09
Payer: COMMERCIAL

## 2025-06-09 VITALS
WEIGHT: 315 LBS | HEIGHT: 78 IN | BODY MASS INDEX: 36.45 KG/M2 | SYSTOLIC BLOOD PRESSURE: 148 MMHG | HEART RATE: 53 BPM | DIASTOLIC BLOOD PRESSURE: 90 MMHG

## 2025-06-09 DIAGNOSIS — N39.44 PRIMARY NOCTURNAL ENURESIS: ICD-10-CM

## 2025-06-09 DIAGNOSIS — E66.813 CLASS 3 SEVERE OBESITY WITHOUT SERIOUS COMORBIDITY WITH BODY MASS INDEX (BMI) OF 40.0 TO 44.9 IN ADULT, UNSPECIFIED OBESITY TYPE: ICD-10-CM

## 2025-06-09 DIAGNOSIS — I10 HTN (HYPERTENSION), BENIGN: ICD-10-CM

## 2025-06-09 DIAGNOSIS — J45.990 EXERCISE-INDUCED BRONCHOSPASM (HHS-HCC): ICD-10-CM

## 2025-06-09 DIAGNOSIS — Z02.5 SPORTS PHYSICAL: ICD-10-CM

## 2025-06-09 DIAGNOSIS — L30.8 OTHER ECZEMA: ICD-10-CM

## 2025-06-09 DIAGNOSIS — G47.33 OSA (OBSTRUCTIVE SLEEP APNEA): ICD-10-CM

## 2025-06-09 DIAGNOSIS — L21.9 SEBORRHEIC DERMATITIS: Primary | ICD-10-CM

## 2025-06-09 LAB
25(OH)D3 SERPL-MCNC: 20 NG/ML (ref 30–100)
ALBUMIN SERPL BCP-MCNC: 4.4 G/DL (ref 3.4–5)
ALP SERPL-CCNC: 80 U/L (ref 33–120)
ALT SERPL W P-5'-P-CCNC: 61 U/L (ref 10–52)
ANION GAP SERPL CALC-SCNC: 11 MMOL/L (ref 10–20)
APPEARANCE UR: CLEAR
AST SERPL W P-5'-P-CCNC: 26 U/L (ref 9–39)
BASOPHILS # BLD AUTO: 0.05 X10*3/UL (ref 0–0.1)
BASOPHILS NFR BLD AUTO: 0.6 %
BILIRUB SERPL-MCNC: 0.6 MG/DL (ref 0–1.2)
BILIRUB UR STRIP.AUTO-MCNC: NEGATIVE MG/DL
BUN SERPL-MCNC: 12 MG/DL (ref 6–23)
CALCIUM SERPL-MCNC: 9.4 MG/DL (ref 8.6–10.6)
CHLORIDE SERPL-SCNC: 109 MMOL/L (ref 98–107)
CHOLEST SERPL-MCNC: 165 MG/DL (ref 0–199)
CHOLESTEROL/HDL RATIO: 4
CO2 SERPL-SCNC: 26 MMOL/L (ref 21–32)
COLOR UR: YELLOW
CREAT SERPL-MCNC: 1.32 MG/DL (ref 0.5–1.3)
EGFRCR SERPLBLD CKD-EPI 2021: 78 ML/MIN/1.73M*2
EOSINOPHIL # BLD AUTO: 0.08 X10*3/UL (ref 0–0.7)
EOSINOPHIL NFR BLD AUTO: 0.9 %
ERYTHROCYTE [DISTWIDTH] IN BLOOD BY AUTOMATED COUNT: 13.2 % (ref 11.5–14.5)
FERRITIN SERPL-MCNC: 146 NG/ML (ref 20–300)
GLUCOSE SERPL-MCNC: 110 MG/DL (ref 74–99)
GLUCOSE UR STRIP.AUTO-MCNC: NORMAL MG/DL
HCT VFR BLD AUTO: 47.9 % (ref 41–52)
HDLC SERPL-MCNC: 41.1 MG/DL
HGB BLD-MCNC: 15.3 G/DL (ref 13.5–17.5)
IMM GRANULOCYTES # BLD AUTO: 0.02 X10*3/UL (ref 0–0.7)
IMM GRANULOCYTES NFR BLD AUTO: 0.2 % (ref 0–0.9)
KETONES UR STRIP.AUTO-MCNC: NEGATIVE MG/DL
LDLC SERPL CALC-MCNC: 106 MG/DL
LEUKOCYTE ESTERASE UR QL STRIP.AUTO: ABNORMAL
LYMPHOCYTES # BLD AUTO: 2.59 X10*3/UL (ref 1.2–4.8)
LYMPHOCYTES NFR BLD AUTO: 29.1 %
MAGNESIUM SERPL-MCNC: 2.24 MG/DL (ref 1.6–2.4)
MCH RBC QN AUTO: 28.6 PG (ref 26–34)
MCHC RBC AUTO-ENTMCNC: 31.9 G/DL (ref 32–36)
MCV RBC AUTO: 90 FL (ref 80–100)
MONOCYTES # BLD AUTO: 0.57 X10*3/UL (ref 0.1–1)
MONOCYTES NFR BLD AUTO: 6.4 %
MUCOUS THREADS #/AREA URNS AUTO: ABNORMAL /LPF
NEUTROPHILS # BLD AUTO: 5.58 X10*3/UL (ref 1.2–7.7)
NEUTROPHILS NFR BLD AUTO: 62.8 %
NITRITE UR QL STRIP.AUTO: NEGATIVE
NON HDL CHOLESTEROL: 124 MG/DL (ref 0–149)
NRBC BLD-RTO: 0 /100 WBCS (ref 0–0)
PH UR STRIP.AUTO: 6 [PH]
PLATELET # BLD AUTO: 281 X10*3/UL (ref 150–450)
POTASSIUM SERPL-SCNC: 4.3 MMOL/L (ref 3.5–5.3)
PROT SERPL-MCNC: 7.1 G/DL (ref 6.4–8.2)
PROT UR STRIP.AUTO-MCNC: ABNORMAL MG/DL
RBC # BLD AUTO: 5.35 X10*6/UL (ref 4.5–5.9)
RBC # UR STRIP.AUTO: NEGATIVE MG/DL
RBC #/AREA URNS AUTO: ABNORMAL /HPF
SODIUM SERPL-SCNC: 142 MMOL/L (ref 136–145)
SP GR UR STRIP.AUTO: 1.02
SQUAMOUS #/AREA URNS AUTO: ABNORMAL /HPF
TRIGL SERPL-MCNC: 91 MG/DL (ref 0–114)
TSH SERPL-ACNC: 1.1 MIU/L (ref 0.44–3.98)
URATE SERPL-MCNC: 8.6 MG/DL (ref 4–7.5)
UROBILINOGEN UR STRIP.AUTO-MCNC: NORMAL MG/DL
VLDL: 18 MG/DL (ref 0–40)
WBC # BLD AUTO: 8.9 X10*3/UL (ref 4.4–11.3)
WBC #/AREA URNS AUTO: ABNORMAL /HPF

## 2025-06-09 PROCEDURE — 36415 COLL VENOUS BLD VENIPUNCTURE: CPT

## 2025-06-09 PROCEDURE — 71045 X-RAY EXAM CHEST 1 VIEW: CPT | Mod: 398

## 2025-06-09 RX ORDER — KETOCONAZOLE 20 MG/ML
SHAMPOO, SUSPENSION TOPICAL EVERY OTHER DAY
Qty: 120 ML | Refills: 3 | Status: SHIPPED | OUTPATIENT
Start: 2025-06-09

## 2025-06-09 RX ORDER — TRIAMCINOLONE ACETONIDE 1 MG/G
CREAM TOPICAL 2 TIMES DAILY PRN
Qty: 80 G | Refills: 1 | Status: SHIPPED | OUTPATIENT
Start: 2025-06-09

## 2025-06-09 NOTE — PROGRESS NOTES
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 He reports one previous concussion in April 2021 and was out for 2 weeks. He has had no residual issues with it. He denies any past history of heat illness, ADHD or migraine headache.   He has a positive history of childhood asthma and still uses an inhaler 2-3 times per year during cold weather activity.   He has a history of hypertension and is prescribed lisinopril 40 mg daily which he admits that he does not take this every day and only when he feels like it. He has been on this for 6 or 7 years.   He reports sleep apnea since he was freshman in college.  He has CPAP machine but does not like the facemask and currently is not using it. He has tried 3-4 masks and does not like any of them.  He is stable and doing well on Mounjaro for weight loss and denies any side effects.  He is still having nocturnal enuresis and reports taking his desmopressin 0.60 mg dose every day.  He reports  no improvement and is frustrated. He would like to see urology.  He denies any drug allergies. He denies any supplementation use.    He has no other complaints today.    Physical Examination:       11/19/2024     3:26 PM 11/19/2024     6:58 PM 11/20/2024    12:02 AM 11/20/2024     2:59 AM 11/20/2024     7:00 AM 11/20/2024     9:20 AM 6/9/2025     1:19 PM   Vitals   Systolic 159 161 172 173 192 170 148   Diastolic 104 88 91 94 96 98 90   BP Location Right arm Right arm Right arm Right arm Right arm Right arm    Heart Rate 86 90 73 79 87  53   Temp 36.1 °C (97 °F) 36.7 °C (98.1 °F) 36.7 °C (98.1 °F) 36.5 °C (97.7 °F) 36.1 °C (97 °F)     Resp 16 16 16 16 16     Height       2.032 m (6' 8\")   Weight (lb)       384.8   BMI       42.27 kg/m2   BSA (m2)       3.14 m2   Visit Report       Report       CONSTITUTIONAL  General appearance: Alert and in no acute distress. Well developed, well nourished.   HEAD " AND FACE  Head and face: Normal.    EYES  External Eye, Conjunctiva and Lids: Normal external exam - pupils were equal in size, round, reactive to light (PERRL) with normal accommodation and extraocular movements intact (EOMI).    Pupils and irises: Normal.    EARS, NOSE, MOUTH, AND THROAT  External inspection of ears and nose: Normal.     Hearing: Normal.     Nasal mucosa, septum, and turbinates: Normal.     Lips, teeth, and gums: Normal.     Oropharynx: Normal.    NECK  Neck: No neck mass was observed. Supple.    Thyroid: Not enlarged and there were no palpable thyroid nodules.   PULMONARY  Respiratory effort: No respiratory distress.    Auscultation of lungs: Clear bilateral breath sounds.   CARDIOVASCULAR  Auscultation of heart: Heart rate and rhythm were normal, normal S1 and S2, no gallops, no murmurs and no pericardial rub.    Femoral pulses: Normal.     Pedal pulses: Normal.    Peripheral vascular exam: Normal.     Examination of extremities: No peripheral edema.   ABDOMEN  Abdomen: Normal bowel sounds, soft nontender; no abdominal mass palpated. No rebound, rigidity or guarding.    Liver and spleen: No hepatosplenomegaly.    Examination for hernias: No hernias.   GENITOURINARY  Scrotal contents: Normal. The testicles were not swollen and there were no testicular masses.    Penis: No penile abnormalities.   LYMPHATIC  Palpation of lymph nodes in neck: No lymphadenopathy.   MUSCULOSKELETAL  Gait and station: Normal.    Digits and nails: No clubbing or cyanosis of the fingernails.    Inspection/palpation of joints, bones, and muscles: No joint swelling seen, normal movements of all extremities.    Range of motion: Normal.     Stability: Normal.     Muscle strength/tone: Normal.    SKIN  Skin and subcutaneous tissue: Normal skin color and pigmentation, normal skin turgor, and no rash.    Palpation of skin and subcutaneous tissue: Normal.    NEUROLOGIC  Cranial nerves: 2-12 grossly intact.    Cortical function:  Normal.     Sensation: Normal.     Coordination: Normal.    PSYCHIATRIC  Judgment and insight: Intact.    Orientation to person, place, and time: Alert and oriented x 3.    Recent and remote memory: Normal.     Mood and affect: Normal.    Imaging/studies:    Chest x-ray: two-view chest x-ray obtained today are without acute abnormality.    EKG: Normal sinus rhythm, no other abnormalities.    Labs: Pending     1. Sports physical  CBC and Auto Differential    Comprehensive Metabolic Panel    Ferritin    Lipid Panel    TSH with reflex to Free T4 if abnormal    Uric Acid    Urinalysis with Reflex Microscopic    Vitamin D 25-Hydroxy,Total (for eval of Vitamin D levels)    Magnesium    XR chest 1 view    CANCELED: XR chest 2 views      2. HTN (hypertension), benign        3. Primary nocturnal enuresis        4. CESAR (obstructive sleep apnea)        5. Exercise-induced bronchospasm (HHS-HCC)        6. Class 3 severe obesity without serious comorbidity with body mass index (BMI) of 40.0 to 44.9 in adult, unspecified obesity type              Discussion:     He was found to have a normal physical exam, EKG and chest x-ray today. He needs no further evaluation and is cleared for full participation.  HTN-we discussed that BP is borderline high and unacceptable to play football.  He was strongly encouraged to take his daily medicine.  He will check his blood pressure in the training room with a large cuff and manual machine.  CESAR-we will discuss with dental to see if he can try a mouth piece. He has tried multiple masks and nothing works for him to tolerance. He needs to use to his CPAP nightly.  This should help his nocturnal enuresis and BP.  EIB-stable with current medicines  Obesity-continue current injectable medication, Mounjaro.    Nocturnal enuresis- we will refer to urology. I strongly encouraged to take medicine every night and continue current dose.    **This note was dictated using Dragon speech recognition software  and was not corrected for spelling or grammatical errors**.    Paul Boyd MD  Sports Medicine Specialist  [facility] [place]

## 2025-06-12 DIAGNOSIS — R82.71 ASYMPTOMATIC BACTERIURIA: Primary | ICD-10-CM

## 2025-06-12 RX ORDER — SULFAMETHOXAZOLE AND TRIMETHOPRIM 800; 160 MG/1; MG/1
1 TABLET ORAL 2 TIMES DAILY
Qty: 20 TABLET | Refills: 0 | Status: SHIPPED | OUTPATIENT
Start: 2025-06-12 | End: 2025-06-22

## 2025-06-12 NOTE — PROGRESS NOTES
Screening labs revealed urinalysis with positive leukocyte esterase.  He denies any dysuria or other complaints.  In light of his chronic nocturnal enuresis, I recommend a course of antibiotics.

## 2025-06-18 ENCOUNTER — APPOINTMENT (OUTPATIENT)
Dept: OPHTHALMOLOGY | Facility: CLINIC | Age: 25
End: 2025-06-18
Payer: COMMERCIAL

## 2025-06-18 DIAGNOSIS — H04.123 DRY EYES, BILATERAL: ICD-10-CM

## 2025-06-18 DIAGNOSIS — Z04.9 DISEASE RULED OUT AFTER EXAMINATION: Primary | ICD-10-CM

## 2025-06-18 ASSESSMENT — ENCOUNTER SYMPTOMS
RESPIRATORY NEGATIVE: 0
ENDOCRINE NEGATIVE: 0
CONSTITUTIONAL NEGATIVE: 0
EYES NEGATIVE: 1
MUSCULOSKELETAL NEGATIVE: 0
NEUROLOGICAL NEGATIVE: 0
ALLERGIC/IMMUNOLOGIC NEGATIVE: 0
PSYCHIATRIC NEGATIVE: 0
HEMATOLOGIC/LYMPHATIC NEGATIVE: 0
GASTROINTESTINAL NEGATIVE: 0
CARDIOVASCULAR NEGATIVE: 0

## 2025-06-18 ASSESSMENT — CONF VISUAL FIELD
OD_INFERIOR_TEMPORAL_RESTRICTION: 0
OD_INFERIOR_NASAL_RESTRICTION: 0
OD_NORMAL: 1
OD_SUPERIOR_TEMPORAL_RESTRICTION: 0
OS_SUPERIOR_TEMPORAL_RESTRICTION: 0
OS_SUPERIOR_NASAL_RESTRICTION: 0
OS_INFERIOR_TEMPORAL_RESTRICTION: 0
OD_SUPERIOR_NASAL_RESTRICTION: 0
OS_NORMAL: 1
OS_INFERIOR_NASAL_RESTRICTION: 0

## 2025-06-18 ASSESSMENT — CUP TO DISC RATIO
OD_RATIO: 0.2
OS_RATIO: 0.2

## 2025-06-18 ASSESSMENT — EXTERNAL EXAM - LEFT EYE: OS_EXAM: NORMAL

## 2025-06-18 ASSESSMENT — TONOMETRY
OS_IOP_MMHG: 19
OD_IOP_MMHG: 19
IOP_METHOD: GOLDMANN APPLANATION

## 2025-06-18 ASSESSMENT — VISUAL ACUITY
OD_SC: J1+
OS_SC: J1+
METHOD: SNELLEN - LINEAR
OS_SC: 20/20
OD_SC: 20/20

## 2025-06-18 ASSESSMENT — EXTERNAL EXAM - RIGHT EYE: OD_EXAM: NORMAL

## 2025-06-18 ASSESSMENT — SLIT LAMP EXAM - LIDS
COMMENTS: NORMAL
COMMENTS: NORMAL

## 2025-06-18 NOTE — PROGRESS NOTES
Assessment/Plan   Diagnoses and all orders for this visit:  Disease ruled out after examination  -     OCT, Retina - OU - Both Eyes  -     Color Fundus Photography - OU - Both Eyes  Dry eyes, bilateral        OCT : 06/18/25     OD: Normal Foveal Contour & Retinal laminations, EZ line preserved, (-) IRF/subretinal fluid (SRF), CST-WNL  OS: Normal Foveal Contour & Retinal laminations, EZ line preserved, (-) IRF/subretinal fluid (SRF), CST-WNL       He needs artificial tears in both eyes, four times a day. Refresh or genteal would be appropriate. This should resolve the dry eyes. Otherwise exam was unremarkable.

## 2025-08-02 ENCOUNTER — HOSPITAL ENCOUNTER (OUTPATIENT)
Dept: RADIOLOGY | Facility: CLINIC | Age: 25
Discharge: HOME | End: 2025-08-02
Payer: COMMERCIAL

## 2025-08-02 DIAGNOSIS — S63.681A OTHER SPRAIN OF RIGHT THUMB, INITIAL ENCOUNTER: Primary | ICD-10-CM

## 2025-08-02 DIAGNOSIS — S63.681A OTHER SPRAIN OF RIGHT THUMB, INITIAL ENCOUNTER: ICD-10-CM

## 2025-08-02 PROCEDURE — 73140 X-RAY EXAM OF FINGER(S): CPT | Mod: RT,398

## 2025-08-02 RX ORDER — DICLOFENAC SODIUM 75 MG/1
75 TABLET, DELAYED RELEASE ORAL 2 TIMES DAILY
Qty: 30 TABLET | Refills: 0 | Status: SHIPPED | OUTPATIENT
Start: 2025-08-02 | End: 2025-08-17

## 2025-09-03 DIAGNOSIS — J45.901 ASTHMA EXACERBATION, MILD (HHS-HCC): Primary | ICD-10-CM

## 2025-09-03 RX ORDER — AZITHROMYCIN 250 MG/1
TABLET, FILM COATED ORAL
Qty: 6 TABLET | Refills: 0 | Status: SHIPPED | OUTPATIENT
Start: 2025-09-03 | End: 2025-09-08

## 2025-09-03 RX ORDER — FLUTICASONE PROPIONATE 110 UG/1
1 AEROSOL, METERED RESPIRATORY (INHALATION)
Qty: 12 G | Refills: 0 | Status: SHIPPED | OUTPATIENT
Start: 2025-09-03 | End: 2026-09-03

## 2025-09-03 RX ORDER — BENZONATATE 200 MG/1
200 CAPSULE ORAL 3 TIMES DAILY PRN
Qty: 20 CAPSULE | Refills: 0 | Status: SHIPPED | OUTPATIENT
Start: 2025-09-03 | End: 2025-09-10

## (undated) DEVICE — DRILL BIT, 2.5 MM

## (undated) DEVICE — TUBING, PUMP MAIN 16FT STERILE

## (undated) DEVICE — TUBING, DUAL WAVE, OUTFLOW

## (undated) DEVICE — SCREW, LOW PROFILE, CORTICAL, 3.5 X 18MM, SS: Type: IMPLANTABLE DEVICE | Site: ANKLE | Status: NON-FUNCTIONAL

## (undated) DEVICE — DRILL BIT, 2.0MM CALIBRATED

## (undated) DEVICE — DRILL BIT, 2.7 MM

## (undated) DEVICE — Device